# Patient Record
Sex: MALE | Race: WHITE | NOT HISPANIC OR LATINO | Employment: UNEMPLOYED | URBAN - METROPOLITAN AREA
[De-identification: names, ages, dates, MRNs, and addresses within clinical notes are randomized per-mention and may not be internally consistent; named-entity substitution may affect disease eponyms.]

---

## 2022-10-11 ENCOUNTER — OFFICE VISIT (OUTPATIENT)
Dept: FAMILY MEDICINE | Facility: CLINIC | Age: 27
End: 2022-10-11
Payer: COMMERCIAL

## 2022-10-11 VITALS
HEIGHT: 70 IN | BODY MASS INDEX: 45.1 KG/M2 | RESPIRATION RATE: 15 BRPM | WEIGHT: 315 LBS | DIASTOLIC BLOOD PRESSURE: 74 MMHG | OXYGEN SATURATION: 98 % | TEMPERATURE: 97.9 F | HEART RATE: 96 BPM | SYSTOLIC BLOOD PRESSURE: 110 MMHG

## 2022-10-11 DIAGNOSIS — Z13.220 SCREENING FOR HYPERLIPIDEMIA: ICD-10-CM

## 2022-10-11 DIAGNOSIS — Z13.220 SCREENING FOR LIPID DISORDERS: ICD-10-CM

## 2022-10-11 DIAGNOSIS — L72.0 EPIDERMAL CYST: ICD-10-CM

## 2022-10-11 DIAGNOSIS — Z00.00 ROUTINE GENERAL MEDICAL EXAMINATION AT A HEALTH CARE FACILITY: Primary | ICD-10-CM

## 2022-10-11 PROCEDURE — 99385 PREV VISIT NEW AGE 18-39: CPT | Performed by: NURSE PRACTITIONER

## 2022-10-11 PROCEDURE — 99214 OFFICE O/P EST MOD 30 MIN: CPT | Mod: 25 | Performed by: NURSE PRACTITIONER

## 2022-10-11 ASSESSMENT — ENCOUNTER SYMPTOMS
COUGH: 0
DIZZINESS: 0
WEAKNESS: 0
SHORTNESS OF BREATH: 0
NAUSEA: 0
PALPITATIONS: 0
PARESTHESIAS: 0
MYALGIAS: 0
FREQUENCY: 0
HEMATOCHEZIA: 0
DYSURIA: 0
NERVOUS/ANXIOUS: 0
FEVER: 0
HEADACHES: 1
DIARRHEA: 0
CONSTIPATION: 0
SORE THROAT: 0
EYE PAIN: 0
ABDOMINAL PAIN: 0
ARTHRALGIAS: 0
HEMATURIA: 0
JOINT SWELLING: 0
HEARTBURN: 0
CHILLS: 0

## 2022-10-11 NOTE — PROGRESS NOTES
SUBJECTIVE:   CC: Andres is an 27 year old who presents for preventative health visit.     27 year old year old male  with PMH   Patient Active Problem List   Diagnosis Code     Obesity E66.9     Tendonitis, Achilles, left M76.62     Morbid obesity due to excess calories (H) E66.01     BMI 50.0-59.9, adult (H) Z68.43     CARDIOVASCULAR SCREENING; LDL GOAL LESS THAN 160 Z13.6    in clinic for preventive health care exam.     In addition to the preventive visit, 25  minutes of the appointment were spent evaluating and developing a treatment plan for his additional concern(s).      Acute Add ON:  - lump on left side of head present for long time unknown lengthen of time; unknown change in size; started getting tender 1 month ago; no vision change; + HA to area throbbing lasting 1 day or so w/dizziness;     Patient has been advised of split billing requirements and indicates understanding: Yes  Healthy Habits:     Getting at least 3 servings of Calcium per day:  Yes    Bi-annual eye exam:  NO    Dental care twice a year:  NO    Sleep apnea or symptoms of sleep apnea:  Daytime drowsiness    Diet:  Regular (no restrictions)    Frequency of exercise:  2-3 days/week    Duration of exercise:  Less than 15 minutes    Taking medications regularly:  Yes    Medication side effects:  None    PHQ-2 Total Score: 0    Additional concerns today:  Yes        Today's PHQ-2 Score:   PHQ-2 ( 1999 [employer]) 10/11/2022   Q1: Little interest or pleasure in doing things 0   Q2: Feeling down, depressed or hopeless 0   PHQ-2 Score 0   Q1: Little interest or pleasure in doing things Not at all   Q2: Feeling down, depressed or hopeless Not at all   PHQ-2 Score 0       Abuse: Current or Past(Physical, Sexual or Emotional)- No  Do you feel safe in your environment? Yes        Social History     Tobacco Use     Smoking status: Never     Smokeless tobacco: Never     Tobacco comments:     Father smokes out side   Substance Use Topics     Alcohol use: No      If you drink alcohol do you typically have >3 drinks per day or >7 drinks per week? No    Alcohol Use 10/11/2022   Prescreen: >3 drinks/day or >7 drinks/week? No   No flowsheet data found.    Last PSA: No results found for: PSA    Reviewed orders with patient. Reviewed health maintenance and updated orders accordingly - Yes  Lab work is in process  Labs reviewed in EPIC  BP Readings from Last 3 Encounters:   10/11/22 110/74   10/06/16 122/74   02/01/11 120/60    Wt Readings from Last 3 Encounters:   10/11/22 (!) 214.3 kg (472 lb 6.4 oz)   10/06/16 (!) 158.3 kg (349 lb)   02/01/11 119.7 kg (264 lb) (>99 %, Z= 2.66)*     * Growth percentiles are based on [facility] (Boys, 2-20 Years) data.              Reviewed and updated as needed this visit by clinical staff   Tobacco  Allergies  Meds  Problems  Med Hx  Surg Hx  Fam Hx  Soc   Hx        Reviewed and updated as needed this visit by Provider   Tobacco  Allergies  Meds  Problems  Med Hx  Surg Hx  Fam Hx         Past Medical History:   Diagnosis Date     BMI 50.0-59.9, adult (H) 10/6/2016     CARDIOVASCULAR SCREENING; LDL GOAL LESS THAN 160 10/6/2016     Morbid obesity due to excess calories (H) 10/6/2016     NO ACTIVE PROBLEMS      Obesity 11/17/2008     Tendonitis, Achilles, left 10/6/2016        Review of Systems   Constitutional: Negative for chills and fever.   HENT: Negative for congestion, ear pain, hearing loss and sore throat.    Eyes: Negative for pain and visual disturbance.   Respiratory: Negative for cough and shortness of breath.    Cardiovascular: Negative for chest pain, palpitations and peripheral edema.   Gastrointestinal: Negative for abdominal pain, constipation, diarrhea, heartburn, hematochezia and nausea.   Genitourinary: Negative for dysuria, frequency, genital sores, hematuria, impotence, penile discharge and urgency.   Musculoskeletal: Negative for arthralgias, joint swelling and myalgias.   Skin: Negative for rash.   Neurological:  "Positive for headaches. Negative for dizziness, weakness and paresthesias.   Psychiatric/Behavioral: Negative for mood changes. The patient is not nervous/anxious.        OBJECTIVE:   /74 (BP Location: Left arm, Patient Position: Sitting, Cuff Size: Thigh)   Pulse 96   Temp 97.9  F (36.6  C) (Temporal)   Resp 15   Ht 1.778 m (5' 10\")   Wt (!) 214.3 kg (472 lb 6.4 oz)   SpO2 98%   BMI 67.78 kg/m      Physical Exam  Constitutional:       General: He is not in acute distress.     Appearance: He is well-developed. He is obese. He is not ill-appearing.   HENT:      Head: Normocephalic and atraumatic.      Comments: 3 cm tender, mobile cyst to left frontal region     Right Ear: Tympanic membrane, ear canal and external ear normal.      Left Ear: Tympanic membrane, ear canal and external ear normal.      Nose: Nose normal.      Mouth/Throat:      Mouth: Mucous membranes are moist. No oral lesions.   Eyes:      Extraocular Movements: Extraocular movements intact.      Conjunctiva/sclera: Conjunctivae normal.   Neck:      Thyroid: No thyromegaly.      Trachea: No tracheal deviation.   Cardiovascular:      Rate and Rhythm: Normal rate and regular rhythm.      Heart sounds: Normal heart sounds, S1 normal and S2 normal. No murmur heard.    No S3 or S4 sounds.   Pulmonary:      Effort: Pulmonary effort is normal. No respiratory distress.      Breath sounds: Normal breath sounds. No wheezing or rales.   Abdominal:      General: Bowel sounds are normal.      Palpations: Abdomen is soft. There is no mass.      Tenderness: There is no abdominal tenderness.   Musculoskeletal:         General: No deformity. Normal range of motion.      Cervical back: Neck supple.   Lymphadenopathy:      Cervical: No cervical adenopathy.   Skin:     General: Skin is warm and dry.      Comments: Disheveled; poor hygiene, morbid obese     Neurological:      Mental Status: He is alert and oriented to person, place, and time.      Motor: No " "abnormal muscle tone.      Deep Tendon Reflexes: Reflexes are normal and symmetric.   Psychiatric:         Speech: Speech normal.         Behavior: Behavior normal.         Thought Content: Thought content normal.         Judgment: Judgment normal.           Diagnostic Test Results:  Labs reviewed in Epic  No results found for any visits on 10/11/22.    ASSESSMENT/PLAN:   Andres was seen today for physical.    Diagnoses and all orders for this visit:    Routine general medical examination at a health care facility  Preventative exam w/no abnormalities and/or concerns listed in diagnoses; discussed health maintenance screenings including prostate, breast, cervical and colorectal ca screenings related to gender;  reviewed and reconciled medication, medical history and patient related health concerns  Plan: declined metabolic labs  -     INFLUENZA VACCINE IM > 6 MONTHS VALENT IIV4 (AFLURIA/FLUZONE)  -     INFLUENZA VACCINE IM > 6 MONTHS VALENT IIV4 (AFLURIA/FLUZONE)    Screening for hyperlipidemia  Discussed; low risk; declined     Screening for lipid disorders  Discussed; declined     Epidermal cyst  3 mm cyst; ? Lipoma vs other  Refer to Gen/Surg for excision   -     Adult General Surg Referral; Future    BMI 50.0-59.9, adult (H)  Morbid obesity; discussed risk of DM, HLD, CROWELL,       Patient has been advised of split billing requirements and indicates understanding: Yes    COUNSELING:   Reviewed preventive health counseling, as reflected in patient instructions       Regular exercise       Healthy diet/nutrition       Consider Hep C screening for all patients one time for ages 18-79 years       HIV screeninx in teen years, 1x in adult years, and at intervals if high risk    Estimated body mass index is 67.78 kg/m  as calculated from the following:    Height as of this encounter: 1.778 m (5' 10\").    Weight as of this encounter: 214.3 kg (472 lb 6.4 oz).     Weight management plan: Discussed healthy diet and " exercise guidelines    He reports that he has never smoked. He has never used smokeless tobacco.      Counseling Resources:  ATP IV Guidelines  Pooled Cohorts Equation Calculator  FRAX Risk Assessment  ICSI Preventive Guidelines  Dietary Guidelines for Americans, 2010  USDA's MyPlate  ASA Prophylaxis  Lung CA Screening    SWATHI Montilla CNP  [facility]

## 2022-10-11 NOTE — PATIENT INSTRUCTIONS
seb  Preventive Health Recommendations  Male Ages 26 - 39    Yearly exam:             See your health care provider every year in order to  o   Review health changes.   o   Discuss preventive care.    o   Review your medicines if your doctor has prescribed any.    You should be tested each year for STDs (sexually transmitted diseases), if you re at risk.     After age 35, talk to your provider about cholesterol testing. If you are at risk for heart disease, have your cholesterol tested at least every 5 years.     If you are at risk for diabetes, you should have a diabetes test (fasting glucose).  Shots: Get a flu shot each year. Get a tetanus shot every 10 years.     Nutrition:    Eat at least 5 servings of fruits and vegetables daily.     Eat whole-grain bread, whole-wheat pasta and brown rice instead of white grains and rice.     Get adequate Calcium and Vitamin D.     Lifestyle    Exercise for at least 150 minutes a week (30 minutes a day, 5 days a week). This will help you control your weight and prevent disease.     Limit alcohol to one drink per day.     No smoking.     Wear sunscreen to prevent skin cancer.     See your dentist every six months for an exam and cleaning.     Preventive Health Recommendations  Male Ages 26 - 39    Yearly exam:             See your health care provider every year in order to  o   Review health changes.   o   Discuss preventive care.    o   Review your medicines if your doctor has prescribed any.    You should be tested each year for STDs (sexually transmitted diseases), if you re at risk.     After age 35, talk to your provider about cholesterol testing. If you are at risk for heart disease, have your cholesterol tested at least every 5 years.     If you are at risk for diabetes, you should have a diabetes test (fasting glucose).  Shots: Get a flu shot each year. Get a tetanus shot every 10 years.     Nutrition:    Eat at least 5 servings of fruits and vegetables daily.     Eat  whole-grain bread, whole-wheat pasta and brown rice instead of white grains and rice.     Get adequate Calcium and Vitamin D.     Lifestyle    Exercise for at least 150 minutes a week (30 minutes a day, 5 days a week). This will help you control your weight and prevent disease.     Limit alcohol to one drink per day.     No smoking.     Wear sunscreen to prevent skin cancer.     See your dentist every six months for an exam and cleaning.     Preventive Health Recommendations  Male Ages 26 - 39    Yearly exam:             See your health care provider every year in order to  o   Review health changes.   o   Discuss preventive care.    o   Review your medicines if your doctor has prescribed any.    You should be tested each year for STDs (sexually transmitted diseases), if you re at risk.     After age 35, talk to your provider about cholesterol testing. If you are at risk for heart disease, have your cholesterol tested at least every 5 years.     If you are at risk for diabetes, you should have a diabetes test (fasting glucose).  Shots: Get a flu shot each year. Get a tetanus shot every 10 years.     Nutrition:    Eat at least 5 servings of fruits and vegetables daily.     Eat whole-grain bread, whole-wheat pasta and brown rice instead of white grains and rice.     Get adequate Calcium and Vitamin D.     Lifestyle    Exercise for at least 150 minutes a week (30 minutes a day, 5 days a week). This will help you control your weight and prevent disease.     Limit alcohol to one drink per day.     No smoking.     Wear sunscreen to prevent skin cancer.     See your dentist every six months for an exam and cleaning.

## 2022-10-11 NOTE — PROGRESS NOTES
"  {PROVIDER CHARTING PREFERENCE:050178}    Hipolito Campos is a 27 year old{ACCOMPANIED BY STATEMENT (Optional):422759}, presenting for the following health issues: bump on head     - lump on left side of head present for long time unknown lengthen of time; unknown change in size; started getting tender 1 month ago; no vision change; + HA to area throbbing lasting 1 day or so w/dizziness;     HPI     {SUPERLIST (Optional):583060}  {additonal problems for provider to add (Optional):469399}    Review of Systems   {ROS COMP (Optional):767541}      Objective    /74 (BP Location: Left arm, Patient Position: Sitting, Cuff Size: Thigh)   Pulse 96   Temp 97.9  F (36.6  C) (Temporal)   Resp 15   Ht 1.778 m (5' 10\")   Wt (!) 214.3 kg (472 lb 6.4 oz)   SpO2 98%   BMI 67.78 kg/m    Body mass index is 67.78 kg/m .  Physical Exam   {Exam List (Optional):233280}    {Diagnostic Test Results (Optional):691197}    {AMBULATORY ATTESTATION (Optional):268489}            "

## 2022-11-09 ENCOUNTER — HOSPITAL ENCOUNTER (OUTPATIENT)
Facility: HOSPITAL | Age: 27
End: 2022-11-09
Attending: SURGERY | Admitting: SURGERY
Payer: COMMERCIAL

## 2022-11-09 ENCOUNTER — OFFICE VISIT (OUTPATIENT)
Dept: SURGERY | Facility: CLINIC | Age: 27
End: 2022-11-09
Attending: NURSE PRACTITIONER
Payer: COMMERCIAL

## 2022-11-09 VITALS
HEIGHT: 70 IN | WEIGHT: 315 LBS | HEART RATE: 94 BPM | DIASTOLIC BLOOD PRESSURE: 77 MMHG | BODY MASS INDEX: 45.1 KG/M2 | SYSTOLIC BLOOD PRESSURE: 141 MMHG

## 2022-11-09 DIAGNOSIS — L72.3 SEBACEOUS CYST: Primary | ICD-10-CM

## 2022-11-09 DIAGNOSIS — L72.0 EPIDERMAL CYST: ICD-10-CM

## 2022-11-09 PROCEDURE — 99203 OFFICE O/P NEW LOW 30 MIN: CPT | Performed by: SURGERY

## 2022-11-09 RX ORDER — CEFAZOLIN SODIUM/WATER 3 G/30 ML
3 SYRINGE (ML) INTRAVENOUS
Status: CANCELLED | OUTPATIENT
Start: 2022-11-21

## 2022-11-09 RX ORDER — CEFAZOLIN SODIUM/WATER 3 G/30 ML
3 SYRINGE (ML) INTRAVENOUS SEE ADMIN INSTRUCTIONS
Status: CANCELLED | OUTPATIENT
Start: 2022-11-21

## 2022-11-09 RX ORDER — ACETAMINOPHEN 325 MG/1
975 TABLET ORAL ONCE
Status: CANCELLED | OUTPATIENT
Start: 2022-11-21 | End: 2022-11-09

## 2022-11-09 NOTE — LETTER
11/9/2022         RE: Andres Dominguez  [address] [facility] [address]        Dear Colleague,    Thank you for referring your patient, Andres Dominguez, to the [facility] [place]. Please see a copy of my visit note below.    This is a consultation from No Ref-Primary, Physician to address a subcutaneous mass.    HPI: Pt is here with concerns about a subcutaneous mass located on his head.  It has been present for 2 months.   This lesion is tender.  The lesion has not drained.    Allergies:Nkda [no known drug allergies]    Past Medical History:   Diagnosis Date     BMI 50.0-59.9, adult (H) 10/6/2016     CARDIOVASCULAR SCREENING; LDL GOAL LESS THAN 160 10/6/2016     Morbid obesity due to excess calories (H) 10/6/2016     NO ACTIVE PROBLEMS      Obesity 11/17/2008     Tendonitis, Achilles, left 10/6/2016       Past Surgical History:   Procedure Laterality Date     NO HISTORY OF SURGERY         REVIEW OF SYSTEMS:  10 point ROS is negative except for; as mentioned above.    CURRENT MEDS:    Current Outpatient Medications:      NO ACTIVE MEDICATIONS, , Disp: , Rfl:     There were no vitals taken for this visit.  There is no height or weight on file to calculate BMI.    EXAM:  GENERAL:Well developed he appears his stated age  HEAD & NECK: Extraocular motions intact, anicteric sclera,  ABDOMEN: Soft and nondistended, positive bowel sounds  LUNGS:  CTA  HEART:  RRR  EXTREMITIES: Full mobility,   INTEGUMENT: The patient has a subcutaneous lesion located his scalp.   The lesion is 2 cm wide.    Assessment/Plan: The pt has a  2 cm  subcutaneous lesion located on the scalp.    This lesion is likely either a Sebaccous Cyst. These lesion is growing in size and/or is painful at times.  With these features I recommend removal of this lesion.     He would like to have these lesions removed. I discussed this with he. I discussed the risk of bleeding and infection with the patient.  We will get this scheduled through our clinic.    We also discussed his weight and he agreed to be seen within our weigh management program.  I will set him up with a consult with a bariatrician.    Saulo Manley MD ,MD  [phone]  [facility] Department of Surgery      Again, thank you for allowing me to participate in the care of your patient.        Sincerely,        Saulo Manley MD

## 2022-11-16 ENCOUNTER — OFFICE VISIT (OUTPATIENT)
Dept: FAMILY MEDICINE | Facility: CLINIC | Age: 27
End: 2022-11-16
Payer: COMMERCIAL

## 2022-11-16 VITALS
TEMPERATURE: 97 F | RESPIRATION RATE: 22 BRPM | HEIGHT: 71 IN | SYSTOLIC BLOOD PRESSURE: 134 MMHG | WEIGHT: 315 LBS | BODY MASS INDEX: 44.1 KG/M2 | HEART RATE: 96 BPM | DIASTOLIC BLOOD PRESSURE: 86 MMHG | OXYGEN SATURATION: 96 %

## 2022-11-16 DIAGNOSIS — L72.3 SEBACEOUS CYST: ICD-10-CM

## 2022-11-16 DIAGNOSIS — E66.01 MORBID OBESITY WITH BODY MASS INDEX OF 60.0-69.9 IN ADULT (H): ICD-10-CM

## 2022-11-16 DIAGNOSIS — Z01.818 PREOP GENERAL PHYSICAL EXAM: Primary | ICD-10-CM

## 2022-11-16 LAB — HGB BLD-MCNC: 15.4 G/DL (ref 13.3–17.7)

## 2022-11-16 PROCEDURE — 85018 HEMOGLOBIN: CPT | Performed by: NURSE PRACTITIONER

## 2022-11-16 PROCEDURE — 36415 COLL VENOUS BLD VENIPUNCTURE: CPT | Performed by: NURSE PRACTITIONER

## 2022-11-16 PROCEDURE — 99214 OFFICE O/P EST MOD 30 MIN: CPT | Performed by: NURSE PRACTITIONER

## 2022-11-16 NOTE — PATIENT INSTRUCTIONS
Preparing for Your Surgery  Getting started  A nurse will call you to review your health history and instructions. They will give you an arrival time based on your scheduled surgery time. Please be ready to share:    Your doctor s clinic name and phone number    Your medical, surgical, and anesthesia history    A list of allergies and sensitivities    A list of medicines, including herbal treatments and over-the-counter drugs    Whether the patient has a legal guardian (ask how to send us the papers in advance)  Please tell us if you re pregnant--or if there s any chance you might be pregnant. Some surgeries may injure a fetus (unborn baby), so they require a pregnancy test. Surgeries that are safe for a fetus don t always need a test, and you can choose whether to have one.   If you have a child who s having surgery, please ask for a copy of Preparing for Your Child s Surgery.    Preparing for surgery    Within 10 to 30 days of surgery: Have a pre-op exam (sometimes called an H&P, or History and Physical). This can be done at a clinic or pre-operative center.  ? If you re having a , you may not need this exam. Talk to your care team.    At your pre-op exam, talk to your care team about all medicines you take. If you need to stop any medicines before surgery, ask when to start taking them again.  ? We do this for your safety. Many medicines can make you bleed too much during surgery. Some change how well surgery (anesthesia) drugs work.    Call your insurance company to let them know you re having surgery. (If you don t have insurance, call [phone].)    Call your clinic if there s any change in your health. This includes signs of a cold or flu (sore throat, runny nose, cough, rash, fever). It also includes a scrape or scratch near the surgery site.    If you have questions on the day of surgery, call your hospital or surgery center.  COVID testing  You may need to be tested for COVID-19 before having  surgery. If so, we will give you instructions (or click here).  Eating and drinking guidelines  For your safety: Unless your surgeon tells you otherwise, follow the guidelines below.    Eat and drink as usual until 8 hours before you arrive for surgery. After that, no food or milk.    Drink clear liquids until 2 hours before you arrive. These are liquids you can see through, like water, Gatorade, and Propel Water. They also include plain black coffee and tea (no cream or milk), candy, and breath mints. You can spit out gum when you arrive.    If you drink alcohol: Stop drinking it the night before surgery.    If your care team tells you to take medicine on the morning of surgery, it s okay to take it with a sip of water.  Preventing infection    Shower or bathe the night before and morning of your surgery. Follow the instructions your clinic gave you. (If no instructions, use regular soap.)    Don t shave or clip hair near your surgery site. We ll remove the hair if needed.    Don t smoke or vape the morning of surgery. You may chew nicotine gum up to 2 hours before surgery. A nicotine patch is okay.  ? Note: Some surgeries require you to completely quit smoking and nicotine. Check with your surgeon.    Your care team will make every effort to keep you safe from infection. We will:  ? Clean our hands often with soap and water (or an alcohol-based hand rub).  ? Clean the skin at your surgery site with a special soap that kills germs.  ? Give you a special gown to keep you warm. (Cold raises the risk of infection.)  ? Wear special hair covers, masks, gowns and gloves during surgery.  ? Give antibiotic medicine, if prescribed. Not all surgeries need antibiotics.  What to bring on the day of surgery    Photo ID and insurance card    Copy of your health care directive, if you have one    Glasses and hearing aids (bring cases)  ? You can t wear contacts during surgery    Inhaler and eye drops, if you use them (tell us  about these when you arrive)    CPAP machine or breathing device, if you use them    A few personal items, if spending the night    If you have . . .  ? A pacemaker, ICD (cardiac defibrillator) or other implant: Bring the ID card.  ? An implanted stimulator: Bring the remote control.  ? A legal guardian: Bring a copy of the certified (court-stamped) guardianship papers.  Please remove any jewelry, including body piercings. Leave jewelry and other valuables at home.  If you re going home the day of surgery    You must have a responsible adult drive you home. They should stay with you overnight as well.    If you don t have someone to stay with you, and you aren t safe to go home alone, we may keep you overnight. Insurance often won t pay for this.  After surgery  If it s hard to control your pain or you need more pain medicine, please call your surgeon s office.  Questions?   If you have any questions for your care team, list them here:   ____________________________________________________________________________________________________________________________________________________________________________________________________________________________________________________________________  For informational purposes only. Not to replace the advice of your health care provider. Copyright   2003, 2019 [employer]. All rights reserved. Clinically reviewed by Abbi Winn MD. [employer] [id number] - REV 10/22.

## 2022-11-16 NOTE — PROGRESS NOTES
[facility] [address] [place] [address]  Phone: [phone]  Primary Provider: No Ref-Primary, Physician  Pre-op Performing Provider: PADMAJA AJ      PREOPERATIVE EVALUATION:  Today's date: 11/16/2022    Andres Dominguez is a 27 year old male who presents for a preoperative evaluation.    Surgical Information:  Surgery/Procedure: EXCISION, LESION, HEAD  Surgery Location: [facility]  Surgeon: Dr. Manley  Surgery Date: 11/21/2022  Time of Surgery: 11:40 AM  Where patient plans to recover: At home with family  Fax number for surgical facility: Note does not need to be faxed, will be available electronically in Epic.    Type of Anesthesia Anticipated: TBD    Assessment & Plan     The proposed surgical procedure is considered INTERMEDIATE risk.    (Z01.818) Preop general physical exam  (primary encounter diagnosis)  Comment:   Plan: Hemoglobin            (L72.3) Sebaceous cyst  Comment:   Plan:     (E66.01,  Z68.44) Morbid obesity with body mass index of 60.0-69.9 in adult (H)  Comment:   Plan:            Risks and Recommendations:  The patient has the following additional risks and recommendations for perioperative complications:   - Morbid obesity (BMI >40)        RECOMMENDATION:  APPROVAL GIVEN to proceed with proposed procedure, without further diagnostic evaluation.              Subjective     HPI related to upcoming procedure: Sebaceous cyst on scalp present for over 2 months, tender.     Preop Questions 11/16/2022   1. Have you ever had a heart attack or stroke? No   2. Have you ever had surgery on your heart or blood vessels, such as a stent placement, a coronary artery bypass, or surgery on an artery in your head, neck, heart, or legs? No   3. Do you have chest pain with activity? No   4. Do you have a history of  heart failure? No   5. Do you currently have a cold, bronchitis or symptoms of other infection? No   6. Do you have a cough, shortness of breath, or  wheezing? No   7. Do you or anyone in your family have previous history of blood clots? No   8. Do you or does anyone in your family have a serious bleeding problem such as prolonged bleeding following surgeries or cuts? No   9. Have you ever had problems with anemia or been told to take iron pills? No   10. Have you had any abnormal blood loss such as black, tarry or bloody stools? No   11. Have you ever had a blood transfusion? No   12. Are you willing to have a blood transfusion if it is medically needed before, during, or after your surgery? NO    13. Have you or any of your relatives ever had problems with anesthesia? No   14. Do you have sleep apnea, excessive snoring or daytime drowsiness? No   15. Do you have any artifical heart valves or other implanted medical devices like a pacemaker, defibrillator, or continuous glucose monitor? No   16. Do you have artificial joints? No   17. Are you allergic to latex? No       Health Care Directive:  Patient does not have a Health Care Directive or Living Will:     Preoperative Review of :   reviewed - no record of controlled substances prescribed.          Review of Systems  CONSTITUTIONAL: NEGATIVE for fever, chills, change in weight  INTEGUMENTARY/SKIN: NEGATIVE for worrisome rashes, moles or lesions  EYES: NEGATIVE for vision changes or irritation  ENT/MOUTH: NEGATIVE for ear, mouth and throat problems  RESP: NEGATIVE for significant cough or SOB  CV: NEGATIVE for chest pain, palpitations or peripheral edema  GI: NEGATIVE for nausea, abdominal pain, heartburn, or change in bowel habits  : NEGATIVE for frequency, dysuria, or hematuria  MUSCULOSKELETAL: NEGATIVE for significant arthralgias or myalgia  NEURO: NEGATIVE for weakness, dizziness or paresthesias  ENDOCRINE: NEGATIVE for temperature intolerance, skin/hair changes  HEME: NEGATIVE for bleeding problems  PSYCHIATRIC: NEGATIVE for changes in mood or affect    Patient Active Problem List    Diagnosis  "Date Noted     Tendonitis, Achilles, left 10/06/2016     Priority: Medium     Morbid obesity due to excess calories (H) 10/06/2016     Priority: Medium     BMI 50.0-59.9, adult (H) 10/06/2016     Priority: Medium     CARDIOVASCULAR SCREENING; LDL GOAL LESS THAN 160 10/06/2016     Priority: Medium     Obesity 11/17/2008     Priority: Medium      Past Medical History:   Diagnosis Date     BMI 50.0-59.9, adult (H) 10/6/2016     CARDIOVASCULAR SCREENING; LDL GOAL LESS THAN 160 10/6/2016     Morbid obesity due to excess calories (H) 10/6/2016     NO ACTIVE PROBLEMS      Obesity 11/17/2008     Tendonitis, Achilles, left 10/6/2016     Past Surgical History:   Procedure Laterality Date     NO HISTORY OF SURGERY       Current Outpatient Medications   Medication Sig Dispense Refill     NO ACTIVE MEDICATIONS          Allergies   Allergen Reactions     Nkda [No Known Drug Allergies]         Social History     Tobacco Use     Smoking status: Never     Smokeless tobacco: Never     Tobacco comments:     Father smokes out side   Substance Use Topics     Alcohol use: No       History   Drug Use No         Objective     /86 (BP Location: Left arm, Patient Position: Sitting, Cuff Size: Adult Large)   Pulse 96   Temp 97  F (36.1  C) (Temporal)   Resp 22   Ht 1.808 m (5' 11.18\")   Wt (!) 214.6 kg (473 lb)   SpO2 96%   BMI 65.64 kg/m      Physical Exam    GENERAL APPEARANCE: healthy, alert and no distress     EYES: EOMI,  PERRL     HENT: ear canals and TM's normal and nose and mouth without ulcers or lesions     NECK: no adenopathy, no asymmetry, masses, or scars and thyroid normal to palpation     RESP: lungs clear to auscultation - no rales, rhonchi or wheezes     CV: regular rates and rhythm, normal S1 S2, no S3 or S4 and no murmur, click or rub     ABDOMEN:  soft, nontender, no HSM or masses and bowel sounds normal     MS: extremities normal- no gross deformities noted, no evidence of inflammation in joints, FROM in all " extremities.     SKIN: cystic nodule on scalp     NEURO: Normal strength and tone, sensory exam grossly normal, mentation intact and speech normal     PSYCH: mentation appears normal. and affect normal/bright     LYMPHATICS: No cervical adenopathy    No results for input(s): HGB, PLT, INR, NA, POTASSIUM, CR, A1C in the last 83138 hours.     Diagnostics:  Labs pending at this time.  Results will be reviewed when available.   No EKG required, no history of coronary heart disease, significant arrhythmia, peripheral arterial disease or other structural heart disease.    Revised Cardiac Risk Index (RCRI):  The patient has the following serious cardiovascular risks for perioperative complications:   - No serious cardiac risks = 0 points     RCRI Interpretation: 0 points: Class I (very low risk - 0.4% complication rate)           Signed Electronically by: Marci Allen NP  Copy of this evaluation report is provided to requesting physician.

## 2022-11-20 RX ORDER — LIDOCAINE 40 MG/G
CREAM TOPICAL
Status: CANCELLED | OUTPATIENT
Start: 2022-11-20

## 2022-11-20 RX ORDER — ACETAMINOPHEN 325 MG/1
975 TABLET ORAL ONCE
Status: CANCELLED | OUTPATIENT
Start: 2022-11-20 | End: 2022-11-20

## 2022-11-20 RX ORDER — SODIUM CHLORIDE, SODIUM LACTATE, POTASSIUM CHLORIDE, CALCIUM CHLORIDE 600; 310; 30; 20 MG/100ML; MG/100ML; MG/100ML; MG/100ML
INJECTION, SOLUTION INTRAVENOUS CONTINUOUS
Status: CANCELLED | OUTPATIENT
Start: 2022-11-20

## 2022-12-02 ENCOUNTER — TELEPHONE (OUTPATIENT)
Dept: SURGERY | Facility: CLINIC | Age: 27
End: 2022-12-02

## 2022-12-02 NOTE — TELEPHONE ENCOUNTER
Returned call to Marci, caregiver for Andres, to reschedule surgery. Had to leave her a message. Will await return call.

## 2022-12-28 NOTE — PROGRESS NOTES
New Medical Weight Management Consult    PATIENT:  Andres Dominguez  MRN:         [id number]  :         1995  SHYAM:         2022        I had the pleasure of seeing your patient, Andres Dominguez. Full intake/assessment was done to determine barriers to weight loss success and develop a treatment plan. Andres Dominguez is a 27 year old male interested in treatment of medical problems associated with excess weight. He has a height of Data Unavailable, a weight of 0 lbs 0 oz, and the calculated There is no height or weight on file to calculate BMI.    Andres is a patient with early onset class III, super morbid obesity with significant element of familial/genetic influence and without current health consequences that are known, but high risk for sleep apnea and fatty liver disease given his central adiposity/thick neck and fatigued affect. He does need aggressive weight loss plan due to high risk for multi-organ dysfunction/metabolic disease/degenerative skeletal issues over the next 5-10 years.  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      His problem is complicated by strong craving/reward pathways.      Review of the patient's history and habits today suggest that weight gain has been lifelong issues.    Previous Interventions found to be helpful in the past for weight loss include none..    We discussed a toolbox approach to weight management today and he is open to combining dietary therapy with inreased mindfulness techniques, activity/exercise to produce weight reduction. Medication assistance for appetite control was discussed today and on review of the risks/benefits for this patients health history, we've decided to with start appetite suppressant therapy.  Given his super morbid obesity, Wegovy would  offer the highest average weight reduction of currently available appetite suppressant therapies and we'll see if good coverage is provided by his insurance.      ADDENDUM:  Insurance will not cover Wegovy without 12 week trial of combined low calorie diet/phentermine therapy first. Will call patient to discuss dosing options and plan.  Ji Cox MD  1/4/2023  1:45 PM    ASSESSMENT AND PLAN  Problem List Items Addressed This Visit        Digestive    Obesity - Primary    Relevant Medications    Semaglutide-Weight Management (WEGOVY) 0.25 MG/0.5ML SOAJ    Other Relevant Orders    CBC with platelets    Comprehensive metabolic panel    Hemoglobin A1c    TSH    T3 Free    Prolactin    Parathyroid Hormone Intact    25- OH-Vitamin D    Vitamin B12    Adult Sleep Eval & Management  Referral            55 minutes spent on the date of the encounter doing chart review, history and exam, documentation and further activities per the note      Patient has not yet filled out questionnaires the day prior to visit.  Chart review shows:   Visit with Dr. Manley in surgical clinic for cyst removal from his scalp and referred for weight management given super morbid obesity history.  Given his age and no previous history of medication supported weight loss in the recent past, today, we discussed dietary/behavior mindfulness methods, exercise and medication support to help get his weight moving in the proper direction. With a BMI of 65, long term there is a high probability of requiring bariatric surgery tools to complement his dietary/medication management to reduce risk of accelerated degenerative skeletal disease and metabolic/vascular, liver/kidney and neoplastic diseases innate to his condition of super morbid obesity.    No previous imaging on file.  He has the following co-morbidities:    No flowsheet data found.  Bowl of cereal/Turkmen toast/pancakes for breakfast, monster drink.  First meal of the day 9am.   Will  snack through the day with candy. Chips.   Supper is around 6 pm.   Bedtime is 9pm up at 1am. Back to bed around 4am and sleeps until 9am.  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 No snacks/eating at this time.  Lives w/ sisters.      No flowsheet data found.    No flowsheet data found.    No flowsheet data found.    No flowsheet data found.    No flowsheet data found.    No flowsheet data found.    No flowsheet data found.    PAST MEDICAL HISTORY:  Past Medical History:   Diagnosis Date     BMI 50.0-59.9, adult (H) 10/6/2016     CARDIOVASCULAR SCREENING; LDL GOAL LESS THAN 160 10/6/2016     Morbid obesity due to excess calories (H) 10/6/2016     NO ACTIVE PROBLEMS      Obesity 11/17/2008     Tendonitis, Achilles, left 10/6/2016   Plan:  1. Welcome to your weight loss season. To start we'll get rid of the Monster drink and candy snacks/chips during the day. To curb these cravings, we'll start high protein/vegetable intake goal at 3 meals daily. Meals should come every 4.5-6 hours through the day. Given your disrupted sleep pattern, aim for first meal of the day no later than 9-10am and then lunch around 1-3pm then dinner around 6 pm and getting to bed by 9pm.      2. Each meal should have 30 grams of lean protein. If that's too hard, consider using a protein drink in the afternoon, Premiere Protein or Equate Max Protein do a good job and are in most grocery/big box stores.  Goal intake is 100-120grams of protein daily right now.     3. Increase water intake to 100-120oz/day. The combination of increased water and protein should decrease your sweet tooth cravings.    4. 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     5. If covered, we'll ramp up Wegovy:  0.25mg once weekly (I recommend Thursdays if you have the weekends off) for 4 weeks. Let me know if tolerated well and we'll  ramp up then to 0.5mg/week for 4 weeks, then 1mg/week for 4 weeks then 1.7mg/week for 4 weeks and finally 2.4mg/week for 4 weeks. If this medication isn't covered we'll review alternatives.    6. Referral for sleep study given high risk for hypoventilation and sleep apnea in how you hold your weight.    7. Check labs at your convenience this month.    8.recheck with me in 8 weeks and schedule with dietician.    No flowsheet data found.    No flowsheet data found.    No flowsheet data found.    Past Surgical History:   Procedure Laterality Date     NO HISTORY OF SURGERY         Social History     Socioeconomic History     Marital status: Single     Spouse name: Not on file     Number of children: Not on file     Years of education: 6     Highest education level: Not on file   Occupational History     Occupation: student   Tobacco Use     Smoking status: Never     Smokeless tobacco: Never     Tobacco comments:     Father smokes out side   Substance and Sexual Activity     Alcohol use: No     Drug use: No     Sexual activity: Never   Other Topics Concern     Parent/sibling w/ CABG, MI or angioplasty before 65F 55M? No   Social History Narrative     Not on file     Social Determinants of Health     Financial Resource Strain: Not on file   Food Insecurity: Not on file   Transportation Needs: Not on file   Physical Activity: Not on file   Stress: Not on file   Social Connections: Not on file   Intimate Partner Violence: Not on file   Housing Stability: Not on file       MEDICATIONS:   Current Outpatient Medications   Medication Sig Dispense Refill     Semaglutide-Weight Management (WEGOVY) 0.25 MG/0.5ML SOAJ Inject 0.25 mg Subcutaneous once a week for 28 days 4 pens. Will ramp up dose monthly if tolerating well to goal of 2.4mg/week eventually. 2 mL 0     NO ACTIVE MEDICATIONS          ALLERGIES:   Allergies   Allergen Reactions     Nkda [No Known Drug Allergies]            PHYSICAL EXAM:  Objective    There were no  "vitals taken for this visit.         Vitals:  No vitals were obtained today due to virtual visit.    Physical Exam   GENERAL: Young male, super morbid obesity, thick neck and tired appearing eyes, smiling/cheerful but limited responses to mostly \"yes/no\" answers. and no distress  EYES: Eyes grossly normal to inspection.  No discharge or erythema, or obvious scleral/conjunctival abnormalities.  RESP:No visible retractions or increased work of breathing.    SKIN: Visible skin clear. No significant rash, abnormal pigmentation or lesions. Video visit clarity impairs.  NEURO: Cranial nerves grossly intact.  Mentation and speech appropriate for age.  PSYCH: pleasant.       Andres Dominguez is 27 year old  male who presents for a billable video visit today.    How would you like to obtain your AVS? Mail a copy  If dropped from the video visit, the video invitation should be resent by: Text to cell phone: [phone]  Will anyone else be joining your video visit? No      Video Start Time: 9:45 AM    Are there any specific questions or needs that you would like addressed at your visit today? No concern or complaints          Video-Visit Details    Type of service:  Video Visit    Platform used for Video Visit: [employer]    Video End Time (time video stopped): 10:38 AM    Originating Location (pt. Location): Home        Distant Location (provider location):  On-site    Distant Location (provider location):  [facility] [place]         Sincerely,    Ji Cox MD        "

## 2022-12-29 ENCOUNTER — VIRTUAL VISIT (OUTPATIENT)
Dept: SURGERY | Facility: CLINIC | Age: 27
End: 2022-12-29
Payer: COMMERCIAL

## 2022-12-29 DIAGNOSIS — E66.01 CLASS 3 SEVERE OBESITY DUE TO EXCESS CALORIES WITH BODY MASS INDEX (BMI) OF 60.0 TO 69.9 IN ADULT, UNSPECIFIED WHETHER SERIOUS COMORBIDITY PRESENT (H): Primary | ICD-10-CM

## 2022-12-29 DIAGNOSIS — E66.813 CLASS 3 SEVERE OBESITY DUE TO EXCESS CALORIES WITH BODY MASS INDEX (BMI) OF 60.0 TO 69.9 IN ADULT, UNSPECIFIED WHETHER SERIOUS COMORBIDITY PRESENT (H): Primary | ICD-10-CM

## 2022-12-29 PROCEDURE — 99215 OFFICE O/P EST HI 40 MIN: CPT | Mod: GT | Performed by: EMERGENCY MEDICINE

## 2022-12-29 RX ORDER — SEMAGLUTIDE 0.25 MG/.5ML
0.25 INJECTION, SOLUTION SUBCUTANEOUS WEEKLY
Qty: 2 ML | Refills: 0 | Status: SHIPPED | OUTPATIENT
Start: 2022-12-29 | End: 2023-01-04 | Stop reason: ALTCHOICE

## 2022-12-29 RX ORDER — LIRAGLUTIDE 6 MG/ML
INJECTION, SOLUTION SUBCUTANEOUS
Qty: 15 ML | Refills: 5 | Status: SHIPPED | OUTPATIENT
Start: 2022-12-29 | End: 2022-12-29

## 2022-12-29 NOTE — LETTER
2022         RE: Andres Dominguez  [address] [facility] [place] [address]        Dear Colleague,    Thank you for referring your patient, Andres Dominguez, to the [facility] [place]. Please see a copy of my visit note below.    New Medical Weight Management Consult    PATIENT:  Andres Dominguez  MRN:         [id number]  :         1995  SHYAM:         2022        I had the pleasure of seeing your patient, Andres Dominguez. Full intake/assessment was done to determine barriers to weight loss success and develop a treatment plan. Andres Dominguez is a 27 year old male interested in treatment of medical problems associated with excess weight. He has a height of Data Unavailable, a weight of 0 lbs 0 oz, and the calculated There is no height or weight on file to calculate BMI.    Andres is a patient with early onset class III, super morbid obesity with significant element of familial/genetic influence and without current health consequences that are known, but high risk for sleep apnea and fatty liver disease given his central adiposity/thick neck and fatigued affect. He does need aggressive weight loss plan due to high risk for multi-organ dysfunction/metabolic disease/degenerative skeletal issues over the next 5-10 years.  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      His problem is complicated by strong craving/reward pathways.      Review of the patient's history and habits today suggest that weight gain has been lifelong issues.    Previous Interventions found to be helpful in the past for weight loss include none..    We discussed a toolbox approach to weight management today and he is open to combining dietary therapy with inreased mindfulness techniques,  activity/exercise to produce weight reduction. Medication assistance for appetite control was discussed today and on review of the risks/benefits for this patients health history, we've decided to with start appetite suppressant therapy.  Given his super morbid obesity, Wegovy would offer the highest average weight reduction of currently available appetite suppressant therapies and we'll see if good coverage is provided by his insurance.    ASSESSMENT AND PLAN  Problem List Items Addressed This Visit        Digestive    Obesity - Primary    Relevant Medications    Semaglutide-Weight Management (WEGOVY) 0.25 MG/0.5ML SOAJ    Other Relevant Orders    CBC with platelets    Comprehensive metabolic panel    Hemoglobin A1c    TSH    T3 Free    Prolactin    Parathyroid Hormone Intact    25- OH-Vitamin D    Vitamin B12    Adult Sleep Eval & Management  Referral            55 minutes spent on the date of the encounter doing chart review, history and exam, documentation and further activities per the note      Patient has not yet filled out questionnaires the day prior to visit.  Chart review shows:   Visit with Dr. Manley in surgical clinic for cyst removal from his scalp and referred for weight management given super morbid obesity history.  Given his age and no previous history of medication supported weight loss in the recent past, today, we discussed dietary/behavior mindfulness methods, exercise and medication support to help get his weight moving in the proper direction. With a BMI of 65, long term there is a high probability of requiring bariatric surgery tools to complement his dietary/medication management to reduce risk of accelerated degenerative skeletal disease and metabolic/vascular, liver/kidney and neoplastic diseases innate to his condition of super morbid obesity.    No previous imaging on file.  He has the following co-morbidities:    No flowsheet data found.  Bowl of cereal/[place]  toast/pancakes for breakfast, monster drink.  First meal of the day 9am.   Will snack through the day with candy. Chips.   Supper is around 6 pm.   Bedtime is 9pm up at 1am. Back to bed around 4am and sleeps until 9am.  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 No snacks/eating at this time.  Lives w/ sisters.      No flowsheet data found.    No flowsheet data found.    No flowsheet data found.    No flowsheet data found.    No flowsheet data found.    No flowsheet data found.    No flowsheet data found.    PAST MEDICAL HISTORY:  Past Medical History:   Diagnosis Date     BMI 50.0-59.9, adult (H) 10/6/2016     CARDIOVASCULAR SCREENING; LDL GOAL LESS THAN 160 10/6/2016     Morbid obesity due to excess calories (H) 10/6/2016     NO ACTIVE PROBLEMS      Obesity 11/17/2008     Tendonitis, Achilles, left 10/6/2016   Plan:  1. Welcome to your weight loss season. To start we'll get rid of the Monster drink and candy snacks/chips during the day. To curb these cravings, we'll start high protein/vegetable intake goal at 3 meals daily. Meals should come every 4.5-6 hours through the day. Given your disrupted sleep pattern, aim for first meal of the day no later than 9-10am and then lunch around 1-3pm then dinner around 6 pm and getting to bed by 9pm.      2. Each meal should have 30 grams of lean protein. If that's too hard, consider using a protein drink in the afternoon, Premiere Protein or Equate Max Protein do a good job and are in most grocery/big box stores.  Goal intake is 100-120grams of protein daily right now.     3. Increase water intake to 100-120oz/day. The combination of increased water and protein should decrease your sweet tooth cravings.    4. 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     5. If covered, we'll ramp up Wegovy:  0.25mg once weekly (I recommend Thursdays if  you have the weekends off) for 4 weeks. Let me know if tolerated well and we'll ramp up then to 0.5mg/week for 4 weeks, then 1mg/week for 4 weeks then 1.7mg/week for 4 weeks and finally 2.4mg/week for 4 weeks. If this medication isn't covered we'll review alternatives.    6. Referral for sleep study given high risk for hypoventilation and sleep apnea in how you hold your weight.    7. Check labs at your convenience this month.    8.recheck with me in 8 weeks and schedule with dietician.    No flowsheet data found.    No flowsheet data found.    No flowsheet data found.    Past Surgical History:   Procedure Laterality Date     NO HISTORY OF SURGERY         Social History     Socioeconomic History     Marital status: Single     Spouse name: Not on file     Number of children: Not on file     Years of education: 6     Highest education level: Not on file   Occupational History     Occupation: student   Tobacco Use     Smoking status: Never     Smokeless tobacco: Never     Tobacco comments:     Father smokes out side   Substance and Sexual Activity     Alcohol use: No     Drug use: No     Sexual activity: Never   Other Topics Concern     Parent/sibling w/ CABG, MI or angioplasty before 65F 55M? No   Social History Narrative     Not on file     Social Determinants of Health     Financial Resource Strain: Not on file   Food Insecurity: Not on file   Transportation Needs: Not on file   Physical Activity: Not on file   Stress: Not on file   Social Connections: Not on file   Intimate Partner Violence: Not on file   Housing Stability: Not on file       MEDICATIONS:   Current Outpatient Medications   Medication Sig Dispense Refill     Semaglutide-Weight Management (WEGOVY) 0.25 MG/0.5ML SOAJ Inject 0.25 mg Subcutaneous once a week for 28 days 4 pens. Will ramp up dose monthly if tolerating well to goal of 2.4mg/week eventually. 2 mL 0     NO ACTIVE MEDICATIONS          ALLERGIES:   Allergies   Allergen Reactions     Nkda [No  "Known Drug Allergies]            PHYSICAL EXAM:  Objective     There were no vitals taken for this visit.  Vitals - Patient Reported  Weight (Patient Reported): 145.2 kg (320 lb)  Height (Patient Reported): 180.3 cm (5' 11\")  BMI (Based on Pt Reported Ht/Wt): 44.63      Vitals:  No vitals were obtained today due to virtual visit.    Physical Exam   GENERAL: Young male, super morbid obesity, thick neck and tired appearing eyes, smiling/cheerful but limited responses to mostly \"yes/no\" answers. and no distress  EYES: Eyes grossly normal to inspection.  No discharge or erythema, or obvious scleral/conjunctival abnormalities.  RESP:No visible retractions or increased work of breathing.    SKIN: Visible skin clear. No significant rash, abnormal pigmentation or lesions. Video visit clarity impairs.  NEURO: Cranial nerves grossly intact.  Mentation and speech appropriate for age.  PSYCH: pleasant.       Andres Dominguez is 27 year old  male who presents for a billable video visit today.    How would you like to obtain your AVS? Mail a copy  If dropped from the video visit, the video invitation should be resent by: Text to cell phone: [phone]  Will anyone else be joining your video visit? No      Video Start Time: 9:45 AM    Are there any specific questions or needs that you would like addressed at your visit today? No concern or complaints          Video-Visit Details    Type of service:  Video Visit    Platform used for Video Visit: Driblet    Video End Time (time video stopped): 10:38 AM    Originating Location (pt. Location): Home        Distant Location (provider location):  On-site    Distant Location (provider location):  [facility] [place]         Sincerely,    Ji Cox MD            Again, thank you for allowing me to participate in the care of your patient.        Sincerely,        Ji Cox MD    "

## 2022-12-29 NOTE — PATIENT INSTRUCTIONS
"Plan:  1. Welcome to your weight loss season. To start we'll get rid of the Monster drink and candy snacks/chips during the day. To curb these cravings, we'll start high protein/vegetable intake goal at 3 meals daily. Meals should come every 4.5-6 hours through the day. Given your disrupted sleep pattern, aim for first meal of the day no later than 9-10am and then lunch around 1-3pm then dinner around 6 pm and getting to bed by 9pm.      2. Each meal should have 30 grams of lean protein. If that's too hard, consider using a protein drink in the afternoon, Premiere Protein or Equate Max Protein do a good job and are in most grocery/big box stores.  Goal intake is 100-120grams of protein daily right now.     3. Increase water intake to 100-120oz/day. The combination of increased water and protein should decrease your sweet tooth cravings.    4. 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     5. If covered, we'll ramp up Wegovy:  0.25mg once weekly (I recommend Thursdays if you have the weekends off) for 4 weeks. Let me know if tolerated well and we'll ramp up then to 0.5mg/week for 4 weeks, then 1mg/week for 4 weeks then 1.7mg/week for 4 weeks and finally 2.4mg/week for 4 weeks. If this medication isn't covered we'll review alternatives.    6. Referral for sleep study given high risk for hypoventilation and sleep apnea in how you hold your weight.    7. Check labs at your convenience this month.    8.recheck with me in 8 weeks and schedule with dietician.      What makes a person succeed with dramatic and sustained weight loss?    It's being at the right point in your life where you feel the need to lose the weight, not because anyone told you so but because of a voice inside of you that says, \"I am ready for this\".  You're now at a point where you may be feeling anxious, irritable and when you look in the mirror " "you do not recognize the person looking back.  Your healthy self is buried somewhere in that reflexion and you want to free it again.  This is the sort of motivation that leads to success, and it comes from you.    Because the only person that can lose that extra weight is you, I like my patients to focus on the mindset of being in Weight Loss Season.  This gives you permission to make the changes necessary to be consistent with the diet/activity and behavior changes that lead to successful, healthy weight loss.  Nearly any diet plan can work for weight loss, but keeping it healthy and nutrient based to prevent deficiencies/hair loss/fatigue or irritability is vital.  If you have a plan you want to try, we'll work with you to make sure no adjustments are needed to keep you healthy through your weight loss season and working with our Bariatric Dieticians you'll be given expert guidance to customize your diet plan to suit your particular needs. If you don't have your own ideas in mind, we are always happy to suggest well researched and validated plans that provide enough food to prevent hunger but still tap into your excess fat reserves and lose weight in a sustainable fashion.  There is great evidence that lean protein/healthy fat intake with good fiber intake while minimizing simple starches/carbs produces reliable/sustainable weight loss in most people. But some feel more connected to an intermittent fasting/fast mimicking or ketogenic diet.  These protocols can be hard for many to stick with and that's why we prefer the protein/healthy fat focused diets but if these alternative strategies appeal to you, we can work with you to optimize your knowledge and results with these tools.    Losing weight is a temporary commitment, but you need to be \"All In\" to have a good weight loss season.  To avoid frustration, you have to be willing to be on track 19 out of 20 days or even better than that. But, weight loss season is " "generally only 4-8 months in length. After that length of time, it can be hard to maintain a negative calorie balance and our brain, motivations and metabolism will usually bring you to a plateau that cannot be broken in this modern world where other commitments start to take priority. That's when we look to stabilize the weight loss you've achieved.  If you've reached your goal by that time, fantastic, and job well done.  If there is more to go, then after a few months of stabilization, we can usually attack that previous plateau and break into new territory.    Because of this time limitation, we want to really get to work right away and get into a sustainable routine ASAP.  One of the best predictors of how much weight you're going to lose throughout the season is how much you lose in the first 6 weeks, so prepare well and jump in with both feet.      Occasionally, people may feel like they cannot commit fully to the changes necessary and may want to change one thing at a time and \"get used to\" the idea of losing weight.  That is OK because that is where they are in their life, and they cannot fully commit for any number of reasons.  It's part of that internal motivation and they just haven't reached PRIORTY NUMBER ONE status yet. It's possible that what they need is more time to reach that point and I am always willing to work with people that want to \"dabble\", but understand, the amount of success obtained with half measures, is much less than half results. Behavior Change cannot occur until we prepare our minds, bodies and environment for what is to come, action!    As you go through your plan, look for things to keep your motivation rolling.  The most successful people have a goal or target/reward that they are working towards.  Having a reward that celebrates your new fitness, mobility and energy is the best sort because it will encourage you to do well with the weight maintenance phase and long term " "lifestyle changes that promotes keeping the weight off for the long term.  Usually, \"getting healthier\" or improving blood tests or losing weight so your clothes fit better is not as internally motivating as having a tangible reward.  A good weight loss season reward is one that isn't food based, is affordable, but something special:  Something you won't be getting/doing unless you achieve your goal.   It s important to keep to the rule of success:  in order to get the reward, your goal MUST be achieved. Write this reward down, where you can look at it daily and keep it in the front of your mind as you go through your weight loss season and it will help keep you on track.    Tools that help change behavior are vital for success. The most studied and most supported tool for weight loss is nothing more than writing down your food and weight every day.  Every Day.  Accurately and completely.  When you commit to weight loss season, this information tells you whether you're getting ENOUGH food to fuel your weight loss properly as well as teaches you the interaction between different foods you eat and how your body responds with weight loss.  You'll see that sometimes after a heavy workout you don't see the scale move until 2-3 days later.  How saltier meals (chili for example) may make you retain water for 4-5 days before you see the weight come off, you'll get used to the mini-plateaus that develop after a good 3-8 lb drop in weight as well as how you break through if you keep working the diet as you should.    Weight loss is not a linear process, there are mini ups and downs.  Learning how your body loses weight and getting comfortable with that is very rewarding. The act of writing words on paper also solidifies your will power and commitment to the season of weight loss and that by itself changes your brain chemistry/appetite, motivation and prepares you for maximal success.     Behavior change is all about getting " into a new routine.  The old habits and routine have to change because without changing the circumstances of how you gained your weight, it's unlikely you'll enjoy satisfying results. If you have snacking habits, like every time you walk through the kitchen you grab a little something, well, that habit has to change and be replaced by a new habit.  It can be something as simple as keeping a doodle pad on the counter that you make a few scribbles and then walk through the kitchen having not opened the cupboard, or starting with a glass of water and leaving the kitchen without anything else, or checking your food journal to see how many calories you have left for the day.  Boredom is the enemy as are the old habits. Break new ground and try to push those old habits into a deep hole.  There are apps/counseling options available that can help with some of the day to day urges/behaviors if you're struggling. One commercial product that does a good job is Noom.  Unfortunately, there is a subscription fee.    Finally, exercise always helps.  While not mandatory to lose weight, every little bit helps and exercise has so many other benefits that to not work it into your plan is to miss out on all the mood, sleep, stress and general health benefits that come from making yourself a little short of breath and sweaty at least 3-4 days out of the week.  The metabolism and calorie burn benefits aside, almost every chronic ailment in medicine gets better with proper, aerobic exercise.  Allow yourself to start slow and let your body prepare itself to accept harder training 4-6 weeks down the road, but start now and commit to a plan.  Whether you have the means to hire a , join a gym or just walk out your front door or go down to your basement for a video workout, get into a exercise routine and  after 3 weeks of at least 3 times a week exercise you should be at a point where you can slowly start ramping up 10% each week to  "our goal of at least 150-300minutes weekly of aerobic exercise and at least twice weekly resistance training/strenghtening with weights/bands or body weight exercises.     I am a big fan of modifying the free training plan, \"Couch to 5k\", for almost all of my patients. Just type it into [employer] or look it up on your smart phone christian store.  To modify the plan,  you can use the training plan for whatever aerobic activity you do (bike/treadmill/elliptical/rower/pool/etc). During the \"jog\" intervals, you just move a little faster or harder or increase the tension or incline.  You use those little intervals to switch up the workout and recruit more muscles and pump the blood a little more and then recover again in the \"walk\" intervals by slowing down, decreasing the incline or turning down the tension.  3-4 days a week is not that much to ask and the benefits are enormous.  Start slow and develop the base from which you can then build on and reduce the risk of injury.  It's much more important 2-4 months from now to be enjoying your exercise then it is to over exert yourself at the start and hurt yourself.  Starting slowly allows your body to accept the training better down the road when the exercise becomes crucial for weight maintenance.  Without exercise down the road during your maintenance phase, all this hard work you are about to put in can be undone. It usually takes about 100-300 calories a day of exercise to maintain a weight loss and our focus during weight loss season is to generate the routine/activities and hobbies that make that enjoyable/sustainable.    Thanks for taking this first and most important step in your weight loss season.  Commit to it and we will cheerlead you all the way to success.  When things get tough or off track we'll offer guidance and analysis and when you reach your goal we'll celebrate your success.  In the end, it is all about your success, your health and what you do with " it.      Ji Cox MD  [facility]  [phone]          LEAN PROTEIN SOURCES  Getting 20-30 grams of protein, 3 meals daily, is appropriate for most people, some need more but more than about 40 grams per meal is not useful.  General rule is drinking one ounce of water per gram of protein eaten over the course of the day:  70 grams of protein each day, drink 70 oz of water.  Protein Source Portion Calories Grams of Protein                           Nonfat, plain Greek yogurt    (10 grams sugar or less) 3/4 cup (6 oz)  12-17   Light Yogurt (10 grams sugar or less) 3/4 cup (6 oz)  6-8   Protein Shake 1 shake 110-180 15-30   Skim/1% Milk or lactose-free milk 1 cup ( 8 oz)  8   Plain or light, flavored soymilk 1 cup  7-8   Plain or light, hemp milk 1 cup 110 6   Fat Free or 1% Cottage Cheese 1/2 cup 90 15   Part skim ricotta cheese 1/2 cup 100 14   Part skim or reduced fat cheese slices 1 ounce 65-80 8     Mozzarella String Cheese 1 80 8   Canned tuna, chicken, crab or salmon  (canned in water)  1/2 cup 100 15-20   White fish (broiled, grilled, baked) 3 ounces 100 21   [place]/Tuna (broiled, grilled, baked) 3 ounces 150-180 21   Shrimp, Scallops, Lobster, Crab 3 ounces 100 21   Pork loin, Pork Tenderloin 3 ounces 150 21   Boneless, skinless chicken /turkey breast                          (broiled, grilled, baked) 3 ounces 120 21   [place], [place], [place], and Venison 3 ounces 120 21   Lean cuts of red meat and pork (sirloin,   round, tenderloin, flank, ground 93%-96%) 3 ounces 170 21   Lean or Extra Lean Ground Turkey 1/2 cup 150 20   90-95% Lean [place] Burger 1 nelson 140-180 21   Low-fat casserole with lean meat 3/4 cup 200 17   Luncheon Meats                                                        (turkey, lean ham, roast beef, chicken) 3 ounces 100 21   Egg (boiled, poached, scrambled) 1 Egg 60 7   Egg Substitute 1/2 cup 70 10   Nuts (limit to 1 serving per  "day)  3 Tbsp. 150 7   Nut Follansbee (peanut, almond)  Limit to 1 serving or less daily 1 Tbsp. 90 4   Soy Burger (varies) 1  15   Garbanzo, Black, [place] Beans 1/2 cup 110 7   Refried Beans 1/2 cup 100 7   Kidney and Lima beans 1/2 cup 110 7   Tempeh 3 oz 175 18   Vegan crumbles 1/2 cup 100 14   Tofu 1/2 cup 110 14   Chili (beans and extra lean beef or turkey) 1 cup 200 23   Lentil Stew/Soup 1 cup 150 12   Black Bean Soup 1 cup 175 12             How much activity does it take to burn off the occasional treat?  Occasional treats or indulgence doesn't have to set back your weight loss season goals. But if the occasional treat turns into the daily chocolate habit or the chips after dinner, or a can of soda, then your progress will slow dramatically unless your activity and exercise can compensate for those empty, low nutrition/high calorie foods.  Here's some estimations of walking off your snacks depending on general weights.  You can use \"calorie burn calculators\" if you search in [employer], more accurate estimations should ask height/weight/gender and if you have a smart watch/heart rate monitor fitness watch, then your personal burn rate will be much more accurate then these general numbers. But these are in the ballpark.  [email or web address] is a nice reference for many different activities with just entering weight and time spent doing anactivity.    Most pre-packaged snacks/treats or 12 oz cans of soda come in around 150 kcal (small bag of chips (11-12 chips), 2 Tollhouse Cookies, 12 oz Coke, 5 oz of wine(125 kcal), 2 oz Vodka (130kcal),etc).  To burn off this snack/indulgence, walking at a \"walking the dog\" pace or for most people that aren't in training/fitness mode, is about 2.5 MPH. If you move slower than this, you'll burn less calories. If you move faster than this,you'll burn more.     Calories burned are for a 30 minute walk, at 2.5 MPH (traveling 1.25 miles in " "30 minutes):    Body Weight Calories Burned   175 lbs 120 kcal   200 lbs 137 kcal   225 lbs 153 kcal   250 lbs 171 kcal   300 lbs 204 kcal       Speed, intensity, hills and activity type (walk/bike/swim/chores/yardwork/etc) will determine how much energy you burn per hour.  As you can see, for most people, a small snack of 150 kcal is a 30 minute commitment to moving at a modest pace.  A quick stroll. In comparison, most people moving at a \"missing the bus\" walk pace of about 4 mph (or the pace that a fit person may  walk in a marathon race if they can't run anymore).    30 minutes at 4 MPH speedy walk/slow jog on level ground burn rate (2 miles covered in 30 minutes):    Body Weight Calories Burned   175 lbs 198 kcal   200 lbs 227 kcal   225 lbs 255 kcal   250 lbs 283 kcal   300 lbs 340 kcal.     Finding loops in your neighborhood is easy to map out distances by using sites like, MapMyRun.com, MapMyRide.com, or Strava.com.    Get out there and earn it, burn it, or pay it forward.  Banking calories is always a good idea if you know an occasion is coming up where you plan to indulge. The goal for all adults around the work is at [id number] minutes weekly of moderate aerobic activity like those listed above (keeping heart rate at about 50-65% of your maximum heart rate calculation (roughly 220-Age in years (as long as not on heart slowing medicationslike Beta Blockers)).     Hopefully, this drives home the point that snacks need to be intentional during weight loss season as most of us struggle with finding 30-60minutes most days a week to exercise and it takes more than that in many cases to make up for the sweet/treats and indulgences that may have contributed to your weight gain over the years (roughly, a can of soda daily for a year will put a person at risk for a 10-15 lb weight gain each year).        MEDICATIONS FOR WEIGHT LOSS  There are several medications available to assist us in weight loss.  By " themselves, without a mindful change in diet and increase in movement/activity these medications are disappointing in their results. However, combined with a closely monitored program of diet change and exercise they can be very effective in controlling appetite and boosting initial weight loss.  All weight loss medications need continual re-evaluation for efficacy as their side effects and health benefits fail to be worthwhile if a person is not continuing to lose weight or in maintaining their healthy weight.  Some weight loss medications are scheduled drugs, meaning there is at least a theoretical possibility for developing addiction to them, but in practice this is rare.  We do anticipate coming off meds in the future- after stabilization of weight loss is assurred.  Finally, a tolerance can develop and people s perceived efficacy of medication can diminish.  In communication with your physician, it may be appropriate to intermittently take a break from these medications and then restart again (few weeks off then restart again) if a plateau is reached that cannot be broken through.  Each person can respond to a medication differently and to be a good option for you, it will need to be affordable, effective and well tolerated with minimal side effects.    In most cases, weight loss progress after one month and three months will be obtained and if a patient is not reaching the satisfactory progress towards weight loss, the medications may be discontinued.  The thought is that if a person is taking a weight loss medication and not receiving the potential health benefit of that drug, the side effects are not worthwhile and use should be discontinued.  On the flip side, there are many people on some weight loss medications for years because it continues to be an effective tool in their weight management and they are tolerating the medication without any long-term side effects.  Each person's response and purpose will  be evaluated.      PHENTERMINE (Adipex): approved in 1959 for appetite suppression.  It has stimulant effects and cannot be used with Ritalin, Concerta, or other stimulants.  Although it is not highly addictive, it's chemically related to amphetamines which are addictive and is classified as a Controlled Substance by the [place].  Occasional dependence can develop, but rarely. The most common side effects are dry mouth, increased energy and concentration, increased pulse, and constipation.  You should not take phentermine if you have glaucoma, hyperthyroidism, or uncontrolled/untreated hypertension or overly anxious. You should stop if dramatic mood swings, severe insomnia, palpations, chest pains, visual changes or if your Blood Pressure is consistently elevated or any time it's over 160/90.   It's ok to go off the med for a few weeks and restart if efficacy is wearing off.  $24-$30 for 90 tablets at [facility]. Females are required to have reliable birth control to reduce the risk birth defects/miscarriage.      TOPIRAMATE (Topamax): Anti-seizure medication, also used to prevent migraines and sometimes for mood stabilization.  Side effects include paresthesia, glaucoma, altered concentration, attention difficulties, memory and speech problems, metabolic acidosis, depression, increase in body temperature and decrease sweating, risk of kidney stones.  Do not take Topamax while taking Depakote as this can cause high ammonia levels.  You must have reliable birth control as Topamax can cause birth defects.  If prolonged use has occurred it should be tapered off slowly to avoid withdrawal issues.  Insurance usually covers Topiramate.  At higher doses, there may be some confusion/forgetfulness associated with this so we try to limit dose to under 75mg twice daily to reduce this risk. Often covered by insurance as it's used for many reasons.  Topamax will cause carbonated beverages to taste bad. A recheck of your  kidney/electrolytes may occur within a few months of starting.    QSYMIA (Phentermine + Topamax):  See above information about phentermine and Topamax.  Most common side effects are paresthesia, dizziness, distortion of taste, insomnia, constipation, and dry mouth.  See above descriptions for the two individual agents.Females are required to have reliable birth control to reduce the risk birth defects/miscarriage.  $150-$220 per month      GLP1 Agonists:  Liraglutide (Victoza/Saxenda), Semaglutide (Ozempic/Wegovy):   Part of the family of Glucagon Like Peptide Agonists, these medications directly suppresses appetite and are often used by diabetic patients due to improvements in glucose/insulin balance.  They also slow how quickly the stomach empties, increasing fullness. They may be hard to get covered for non diabetics and some plans have exclusions for weight loss purposes.  Currently, these are  injectable medications delivered via autoinjector pen. It can be very costly without insurance coverage (over $500/month).  Small risks for pancreatitis exists and dose should be held if increased mid abdominal pain/burning. It is not to be used if previous Multiple Endocrine Neoplasia. In rodents, may increase risk of thyroid tumors and not indicated for anyone with a history of medullary thyroid cancer as a result.  If changes in voice/swallowing should be discontinued. Reliable birth control required in women. Saxenda.[email or web address], Wegovy.com has more information on these medications.    Contrave (Bupropion/Naltrexone).    Synergistic combination of a mild appetite suppressing anti-depressant (Bupropion) whose effects are increased due to interaction with Naltrexone.  Naltrexone may have some effects on craving and is often used in addiction medicine to help previous opiate addicts be less prone to relapse as it blocks the action of opiates. Should be stopped if any need for opiate pain medication, surgery or planned procedures  "where you'll be given sedation/anesthesia. If prolonged use, recommend stepping down bupropion over 2-3 weeks to limit any risk of withdrawal issues. Side effects may include dry mouth, increased heart rate, mild elevation in Blood pressure;  dizziness, ringing in the ears, anxiety (typically due to bupropion), nausea, constipation, and some get fatigued with naltrexone.  About $210 on [facility] for 120 tabs of \"Contrave\", the brand name without insurance coverage. Generic Bupropion 75mg: $25 for 120 tabs, Naltrexone: $55 for 90 tabs without insurance coverage on [employer] Cannot be used if pregnant/trying to conceive or breast feeding.      Plenity:   By mail order pharmacy only, moderately expensive. $98/month.  2-3 capsules taken with 16 oz of water, 20 minutes before 2 meals daily provides crystals that expand into a gel that fills the stomach 20-25% and provides a mechanical fullness.  The Plenity then mixes with your next meal and increases satisfaction by bulking the meal up to feel bigger than it truly is. Plenity is not absorbed and gets passed through the digestive tract and excreted in stools. May cause some bloating/gas/full feeling as it behaves like a fiber in many ways. Cost not available yet. FDA cleared in March of 2019 and became available near the end of 2020 through mail order pharmacy only ([facility]). The safety and efficacy trials were done under \"Gelesis\" name. Not appropriate for people with stomach or bowel motility issues as requires you to pass it through digestive tract. [email or web address].com has more information.      50 Things to do Instead of Snacking  We'll all have urges to snack sometimes. Hunger, mood, stress, boredom and distraction are common triggers so being mindful and thinking about what is driving a particular urge to snack at a particular time can be helpful for reducing the urge in the future.  Remember, the urge to snack doesn't have to be obeyed. The urge exists, but you can " watch it pass. Over time, you will get good at the exercise of experiencing an urge, acknowledging it, but letting it pass you by and float away.  Until you get there, here are some activities to pass the 3-5 minutes that most urges last. Good hydration is always helpful.  If you do struggle with impulse/urge control, consider some therapy based on cognitive behavioral therapy or ACT (Acceptance and Commitment Therapy).  Apps/subscriptions like [employer] emphasize some of these tools and can be of help for those able to afford the christian/subscription.  1. Imagine the new healthier you   2. Walk around the block   3. Call a friend   4. Make a list of your Top Ten Reasons to Lose Weight   5. Make a To Do list   6. Turn on music and dance   7. Jot a thank you note to someone   8. Go to bed early or take a nap  9. Read a book   10. Blog or journal  11. Give yourself a manicure or pedicure   12. Plan a healthy meal for your family   13. Surf the Internet   14. Finish an unfinished project   15. Walk your dog, pet your cat, feed your fish  16. Brush your teeth   17. Balance your checkbook   18. Say a prayer   19. Chop veggies for later use.  20. Give a massage   21. Clean out a junk drawer   22. Play a game with your kids   23. Try a new route on your walk     24. Drink a glass of water   25. Kiss someone   26. Try on some of your clothes   27. Look at old pictures   28. Rent a video   29. Wash your car   30. Take a hot, soothing bath   31. Update your calendar   32. Work in your yard   33. Start your holiday shopping list   34. Count your blessings   35. Write a letter   36. Fold some laundry   37. Check your e-mail   38. Give your dog a bath   39. Send a birthday card   40. Meditate   41. Hug someone   42. Rearrange some furniture   43. Light a fire or some candles   44. Put your pictures in an album   45. Plan a trip (real or imaginary)  46. Straighten a closet   47. Clean out a files   48. Visit a friend  49. Clean out your  trunk  50. Do something nice for someone         Example Meal Plan for a 2653-5334 Calorie Diet:  Andres, increase portions by 30% to hit a 2000kcal/day goal for now with your 100-120g/day protein goal.    In order to fuel your weight loss properly and avoid hunger-induced overeating later in the day, for your height and weight, you will enjoy the most success by following the diet below or similar with adjustments based on your particular tastes and preferences.  Exercise may influence speed, amount of weight loss further.     I recommend getting into a meal routine and keeping it similar day to day in the beginning so you don t have to think too hard about what you re going to make/eat.  Keep snacks healthy, ideally containing protein and some vegetables.  Non-processed food is preferable to packaged items.  Eat at least a few crunchy green vegetables if having a snack, which should be 2-3 hours after your mealtimes(prepare these ahead of time for ease of use).  Drink 64 oz -80 oz of water daily for most, some of you will need more and we'll discuss it at your visit if that is the case.      When changing our diet,  we can often mistake thirst for hunger or just have some distracted eating habits that we need to break free from ('bored/mindless eating', screen time,work, driving,etc).  A glass of water and reconsideration of our hunger is often all that is needed.  Having the urge is not the problem, but watching it pass by without acting on it is the goal.    If you re having hunger problems, add a protein drink/snack to your morning hours or afternoon snack with at least 20grams of protein and not too much sugar (under 10g).  A carton of higher protein/low sugar yogurt can work as well.  If the urge to snack is overwhelming and not satiated, try going for a 10 minute walk/exercise, come home and drink a glass of water and if still hungry, have a  calorie snack (handful of raw/sprouted nuts, veggies and  string cheese, protein bar, etc).  Savor it.    It is better to have a large breakfast, a moderate lunch and a smaller dinner to fuel your day.  People lose 10-15% more weight during their weight loss season with this strategy. Optimizing your protein intake at each meal will further keep you more satisfied while eating less food overall.  Getting exercise in early has also been shown to offer the best results (before breakfast ideally but anytime is the right time to exercise if that is not an option for you).    To make sure you re getting adequate vitamins and minerals during weight loss, I recommend one complete multivitamin a day of your choice.  Consider a probiotic and taking some vitamin D 2000 IU daily.    Let supper be your last meal of the day and ideally try to have at least 12 hours between supper and breakfast the next day to tap into some beneficial overnight fasting dynamics.  Midnight snacks need to go away. Water in the evening is fine, unsweetened, non caffeinated herbal tea is helpful as well.  Consolidating your meals within a 8-12 hour period of your day will help tap into these additional metabolic benefits and tends to keep your appetite up for breakfast, further helping to stay on track.  For most of my patients, I don't recommend an intermittent fasting style diet (many find it hard to fit in their lifestyle) but an overnight fast is very doable for most patients and helps regulate our hunger drives a little better.  This makes it very important to nail good intake at all three meals to feel satisfied/energized and still lose weight.      If evening snacking desires are high, consider a glass of fiber supplement for some additional fullness (metamucil or similar). Most of us don't get the 25-30 grams per day of fiber that promotes good gut health/satiety.  Benefiber, metamucil, citrucel are reasonable/affordable options for most people.  Inulin, chicory, psyllium husk are reasonable options  but start slow and low in the dose to avoid gas/bloating until your gut gets acclimated (ramping up to 5-10 grams per day of supplemental fiber after 3-4 weeks if needed).      Example Meal Plan:  Breakfast: 450-475 Calories  1 egg cooked on low in olive oil:   calories.  5oz Greek Yogurt (Fage plain classic: ~150 dustin)  Handful of Berries of your choice (about a calorie per berry or 20-40cal per handful)    cup(cooked) of  old fashioned oatmeal or 1/2 cup(cooked) steel cut oats. (150 dustin)  Sprinkle amount of brown sugar and a pat of butter. (40 dustin)  Glass of  Water  Black coffee or unsweetened Tea (0calories).      2-3 hours Later Snack: (195 calories).  Glass of water  One string Cheese (80 calories) or 4 oz creamed cottage cheese (115 calories) with  Crunchy Celery sticks (less than 10 calories per large stalk) 2 stalks. (20 calories)    of a  Large Banana or   of a Large Apple (60 calories):  eat second half at lunch or afternoon snack.     Lunch:300 -350 calories   Chicken Breast  (baked/broiled/roasted/grilled)  4-6 oz.  (125-180 dustin), BBQ sauce/hot sauce/mustard/seasoning is free. Just use a reasonable amount. Or a can of tuna with 1 tablespoon mayonnaise.  Salad: lettuce, any other veggies (cucumbers, green peppers/celery you like and a small drizzle of dressing to just flavor.  Go as big on the veggies as you like,  as they are practically calorie free.   A whole, 8 inch cucumber is 45 calories, a whole green pepper is 23 calories, a stalk of celery is 9 calories.  [place] Dressing is 60 calories per tablespoon..so moderate your desired dressing or do a drizzle of olive oil and splash of balsamic vinegar on top,  Total calories unlikely to be over 150 even with dressing.  Glass of Water.    Option for lunch is meal replacement protein drink/smoothie.  Need at least 20 grams of protein and eat the rest of your apple/banana from the morning snack.      Afternoon Snack: 150-200 calories   Cheese  Stick or cottage cheese again  and a fresh fruit OR  Granola Bar (protein Bar acceptable if under 200 calories OR  Homemade smoothies:  8oz skim milk,  a handful of berries (fresh or frozen and a serving of protein powder such as BiPro or Joselin sWhey for example.  If you don't like dairy, make with 8oz water, one small banana, handful of berries and the protein powder, add any veggies you want as well:  roughly 200 calories.   Glass of Water    Dinner: 325 calories  4oz of fresh, Atlantic salmon.  Broiled (salt/pepper/dill) for about 8-8.5 minutes (200calories) or  4oz filet mignon steak or sirloin steak  Salad or vegetable sautéed lightly in olive oil or   Broccoli 1.5  cups chopped and steamed  or micro-waved in a little water (75 calories)  Glass of Water,    Cup of herbal tea (unsweetened, caffeine free)      Herbs and seasonings are encouraged to flavor your foods/vegetables.  Make your food delicious.      Tips for Success:  1.  Prepare proteins ahead of time (broil chicken breasts in bulk so you can grab and go), steel cut oats/lentils can be stored in casserole dish/bowl in the fridge for quick scoop in the morning and rewarm in microwave, make use of crock pot recipes (watch salt content).  Making meals that cover 3-4 future meals is an easy way to stay on track.  2.  Drink a 8-12 oz glass of water every 2-3 hours when awake.  We often mistake hunger for thirst, especially when losing weight.  3. Remember your Reward and Motivation when things get hard.  4.  Weigh yourself every morning and record, you'll stay on track better and learn how our biorhythms, diet and elimination patterns show up on the scale. Don't worry about 1 or 2 day patterns, but when on track you'll notice good trend downward of weight over 3-4 day segments.  Plateaus tend to resolve after 4-8 days in most cases if you stay consistent with your plan.  These are natural and part of weight loss, even if you're perfect with your plan  "execution.  5. Call if problems/concerns.  [employer] is a great tool to stay in touch and provide weekly outside accountability. Check in with questions or if you want to brag.  6.  Find a handful of meals/foods that keep you on track and feeling good and get into a routine that is sustainable for you.  It's OK to have a routine that works for you.  7.  Consider taking a complete multivitamin just to make sure all micronutrients are adequate during weight loss.  8. If losing hair/brittle nails it usually means you are not taking enough protein.  Minimum goal is 60 grams daily of protein for smaller women, 80 grams a day for men. Consider taking Biotin as supplement or a \"Hair and Nail\" multivitamin.          On-the-Go Breakfast Ideas  As of 2015, the latest research shows what a huge impact eating breakfast has on losing weight and feeling your best. People lose more weight when they make breakfast their biggest meal of the day compared to Dinner, but even if you cannot go to that degree, getting a breakfast that has at least 20 grams of protein and even a moderate amount of fat is ideal for maintaining good energy through the day and limits overeating in the evening hours.  The following are some quick and easy suggestions for at least getting something of substance into your body in the morning.  Enjoy!    Eating breakfast within 90 minutes of waking up is an important part of taking care of your body on a restricted calorie diet plan.  After sleeping for hours, your body is in need of fuel.  An ideal breakfast is a combination of protein, whole-grain carbohydrates, or fruit.  Here s why:    -Protein digests very slowly in the body, helping you feel more satisfied.  -Whole grains provide dietary fiber, which also digests slowly and helps keep your gut clean.  -Fruit is a great source of vitamins, minerals, and fiber.     Each one of these breakfast combinations has between 200-300 calories and 15-20 grams of " protein.  Feel free to mix and match!    Bone Broth (chicken bone broth or beef bone broth) is a great way to boost protein content. 8oz of bone broth will typically have 9-12grams of protein for 40kcal of energy.    Protein: Choose  -1/2 cup low-fat cottage cheese  -2 hard boiled eggs , or one cooked in olive oil (low/slow heat).  -1 low fat string cheese stick  -1 Tablespoon natural peanut butter  -[facility] TabUp vegetarian sausage nelson (found in freezer section)  -1 slice lowfat cheese  -6 oz 2% or lowfat Greek yogurt, such as Fage or Oikos.    PLUS    Whole Grains:  Choose   -1 whole wheat English muffin  -1 whole wheat becka, half  -1/2 Fiber One frozen muffin, thawed  -1/2 Fiber One toaster pastry  -1 whole wheat bagel thin  -1/2 cup Kashi cereal  -1 Kashi waffle (or other whole grain high-fiber waffle)  Aim for whole grain/sprouted breads with at least 3g of fiber/slice if having bread. [place] is one such brand.    OR    Fruit: Choose  -1/3 cup blueberries  -1/2 banana (or a plantain- similar to a banana, yet smaller)  -1/2 cup cantaloupe cubes  -1 small apple  -1 small orange  -1/2 cup strawberries  -handful raspberries/blackberries (each berry is about 1 calorie).    *Adapted from Diabetes Living, Fall 20    Ten Breakfasts Under 250 calories    Ideally, getting between 350-600 calories  (depending on starting height and weight)for breakfast is ideal for avoiding hunger later in the day, adjust/add to the following accordingly:    One- 250 calories, 8.5 g protein  1 slice whole-grain toast   1 Tbsp peanut butter    banana    Two- 250 calories, 8 g protein    cup nonfat/lowfat yogurt  1/3rd cup diced no-sugar peaches  1/3rd cup cereal (like Special K, Cheerios, or bran flakes)    Three- 250 calories, 25 g protein  1 egg scrambled with 1 oz skim milk    cup shredded cheddar    whole grain English muffin  1 oz [place] chavez  1 tsp margarine spread    Four- 225 calories, 25 g protein  1/2 cup Kashi  Go-Lean cereal    cup skim milk mixed with 1 scoop Bariatric Advantage protein powder    cup no-sugar diced pears    Five- 250 calories, 20 g protein    cup oatmeal prepared with skim milk, 1 scoop protein powder, and sugar-free maple syrup    Six- 200 calories, 5 g protein  1 whole grain waffle, toasted  1 tablespoon creamy peanut or almond butter    Seven-  250 calories, 19 g protein  Breakfast sandwich: 1 slice whole grain toast, cut in half.  Add 1 scrambled egg and one slice cheddar  cheese.    Eight-  250 calories, 15 g protein  2 eggs scrambled with 1/3 cup frozen spinach (heat before adding to eggs) and 2 tablespoons low fat cream cheese.    Nine-  150 calories, 15 g protein  2/3rd cup cottage cheese    cup cantaloupe    Ten- 200 calories, 20 g protein  Fruit smoothie made with 4 oz. nonfat Greek yogurt,   cup berries, 1 scoop protein powder, and 4 oz skim milk.    Ten Lunches Under 250 Calories    Aim for lunch to be around 300-400 calories a day when trying to lose weight and get that protein in!    One- 200 calories, 11 g protein  1/3 cup tuna salad made with light sadler on 1 slice whole grain bread  1 small peeled apple    Two- 250 calories, 16 g protein  1/3 cup lowfat cottage cheese    cup cooked green beans    small fruit cocktail (in natural juice)    Three- 200 calories, 11 g protein    grilled cheese sandwich on whole grain bread with lowfat cheese  2/3rd cup of tomato soup    Four- 250 calories, 22 g protein  Deli wrap: 1 oz sliced turkey, 1 oz sliced ham, 1 oz sliced chicken rolled up with 1 slice low-fat cheese  1 small orange    Five- 250 calories, 28 g protein  2/3rd cup chili with 1 oz shredded cheese  4 saltine crackers    Six- 250 calories, 22 g protein  1 cup fresh spinach with 2 oz chicken, 1/3rd cup mandarin oranges, and 2 tablespoons sliced almonds with 1 tablespoon  vinaigrette dressing    Seven- 200 calories, 11 g protein  1 Tbsp sugar-free preserves and 1 Tbsp peanut butter on 1 slice  whole grain toast    cup nonfat/lowfat Greek yogurt    Eight- 250 calories, 18 g protein  1 small soft-shell chicken taco with 1 oz shredded cheese, lettuce, tomato, salsa, and 1 Tbsp light sour cream    cup black beans    Nine- 225 calories, 13 g protein  2 ounces baked chicken  1/4 cup mashed potatoes    cup green beans    Ten- 200 calories, 21 g protein  Deli becka: 2 oz roast beef or other deli meat with 1 tsp Ernesto mayonnaise and sliced tomato, onion, and lettuce  1/3rd cup cottage cheese      Ten Dinners Under 300 calories    If you're eating a large breakfast and medium lunch, keep dinner small.  300-400 calories is ideal for most people depending on their caloric needs.    One- 300 calories, 12 g protein  1-inch thick slice of turkey meatloaf    cup baked butternut squash    Two- 200 calories, 9 g protein  Bread-less BLT: 3 slices turkey chavez, sliced tomato, wrapped in a large lettuce leaf    cup peeled fruit    Three- 275 calories, 36 g protein  3 oz roasted chicken    cup cooked broccoli    cup shredded cheddar cheese    cup unsweetened applesauce    Four- 200 calories, 25 g protein  3 oz baked tilapia  1/3rd cup cooked carrots    cup yogurt    Five- 250 calories, 20 g protein  Grilled ham  n  Swiss: spread 2 tsp ghee or butter on 1 slice of whole grain bread.  Cut bread in half, layer 2 oz deli ham with 1 piece of Swiss cheese and grill until cheese is melted.    cup cooked vegetables    Six- 250 calories, 18 g protein  Vegetarian cheeseburger: 1 [place] cheeseburger topped with lettuce, onion, tomato, and ketchup/mustard    cup sweet potato fries    Seven- 250 calories, 18 g protein  Pork pot roast: 2 oz roasted pork loin, 1/3rd cup roasted carrots,   medium potato, cooked with   cup gravy    Eight- 330 calories, 25 g protein  2 oz meatballs (about 2 small meatballs)    cup spaghetti sauce  1/2 piece toast topped with 1 tsp ghee or butterand topped with garlic powder, toasted in oven    Nine- 250 calories,  16 g protein  Mexican pizza: one 8  corn tortilla topped with 2 oz chicken,   cup salsa, 2 tablespoons black beans, 2 tablespoons shredded cheese.  Bake until cheese is melted.    Ten- 250 calories, 22 g protein  Shrimp stir-hare: 3 oz cooked shrimp, 1/6th onion,   pepper,   cup chopped carrots sautéed in 1 tablespoon olive oil, topped with 2 tablespoons stir hare sauce and a pinch of sesame seeds        150 Calories or Less Snack Ideas   1 hardboiled egg with   cup berries  1 small apple with 1 hardboiled egg  10 almonds with   cup berries  2 clementines with 1 light string cheese  1 light string cheese with   sliced apple  1 light string cheese wrapped in 2 slices of turkey  4 100% whole wheat crackers (e.g. Triscuit) with 1 light string cheese    c. cottage cheese with   cup fruit and 1 Tbsp sunflower seeds     cup cottage cheese with   of an avocado     can tuna fish with 1 cup sliced cucumbers     cup roasted garbanzo beans with paprika and cayenne pepper    baked sweet potato with   cup chili beans or   cup cottage cheese  2 oz. nitrate free turkey slices with 1 cup carrots  1 container (6 oz) of low sugar (less than 10 grams of sugar) greek yogurt   3 Tablespoons of hummus with 1 cup sliced bell peppers   2 Tablespoons of hummus with 15 baby carrots  4 Tablespoons ranch dip made with plain Greek Yogurt and 3 mini cucumbers  1/4 cup nuts (any kind)  1 Tablespoon peanut butter with 1 stalk celery   1 dill pickle wrapped in 1-2 slices of deli ham with 1 tsp of mayonnaise/mustard.

## 2023-01-02 ENCOUNTER — TELEPHONE (OUTPATIENT)
Dept: SURGERY | Facility: CLINIC | Age: 28
End: 2023-01-02

## 2023-01-02 NOTE — TELEPHONE ENCOUNTER
Prior Authorization Retail Medication Request    Medication/Dose: Wegovy 0.25mg/0.5ml PF PEN INJ  Rationale:     Andres is a patient with early onset class III, super morbid obesity with significant element of familial/genetic influence and without current health consequences that are known, but high risk for sleep apnea and fatty liver disease given his central adiposity/thick neck and fatigued affect. He does need aggressive weight loss plan due to high risk for multi-organ dysfunction/metabolic disease/degenerative skeletal issues over the next 5-10 years.      Insurance Name:  [employer]  Insurance ID:  [id number]    Pharmacy Information (if different than what is on RX)  Name:  [facility]  Phone:  [phone]

## 2023-01-03 ENCOUNTER — TELEPHONE (OUTPATIENT)
Dept: SURGERY | Facility: CLINIC | Age: 28
End: 2023-01-03

## 2023-01-03 NOTE — TELEPHONE ENCOUNTER
Sent patient a text message video link to connect. Called patient when he was not connected for the video nutrition visit. Left voicemail instructing patient to connect or to call the clinic (phone number provided) to reschedule appointment.    Tania Maciel RD, LD

## 2023-01-03 NOTE — TELEPHONE ENCOUNTER
PA Initiation    Medication: Wegovy 0.25mg/0.5ml PF PEN INJ  Insurance Company: [employer] - Phone [phone] Fax [phone]  Pharmacy Filling the Rx: [id number] [place] [id number] AT [facility] & [address] [place] [address]  Filling Pharmacy Phone: [phone]  Filling Pharmacy Fax: [phone]  Start Date: 1/3/2023

## 2023-01-04 ENCOUNTER — TELEPHONE (OUTPATIENT)
Dept: SURGERY | Facility: CLINIC | Age: 28
End: 2023-01-04

## 2023-01-04 NOTE — TELEPHONE ENCOUNTER
PRIOR AUTHORIZATION DENIED    Medication: Wegovy 0.25mg/0.5ml PF PEN INJ    Denial Date: 1/3/2023    Denial Rationale: Patient has to first try phentermine for at least 12 weeks.            Appeal Information: If provider would like to appeal this decision we will need a detailed letter of medical necessity to start the process. Then re-route this request back to the PA pool.

## 2023-01-04 NOTE — TELEPHONE ENCOUNTER
Left message regarding need for trial of phentermine before insurance will approve Wegovy trial. Given that he didn't  phone, advised he call us back if interested in a trial and I'll send him information regarding phentermine trial/dosing and potential side effects. He can contact me through [employer] or our nurse line if interested in starting up medication.  Ji Cox MD

## 2023-08-15 ENCOUNTER — TELEPHONE (OUTPATIENT)
Dept: SURGERY | Facility: CLINIC | Age: 28
End: 2023-08-15
Payer: COMMERCIAL

## 2023-08-15 NOTE — TELEPHONE ENCOUNTER
Spoke with Bonnie (mother) today after she left a message to reschedule surgery for Andres.    I let Bonnie know that this order was from November of last year () and that we will need a new order from the provider. I will have the request sent today but the provider is not back in office until next week. She will get a call from us next week regarding scheduling.      Bonnie also asked about a weight loss injectable medication that was previously prescribed but also . I have routed a message to Dr. Cox  and his team as well to inquire about this. Told her that someone should be giving her a call to help them get that medication question worked out.     Bonnie understood and agreed to the plan above.

## 2023-08-16 ENCOUNTER — VIRTUAL VISIT (OUTPATIENT)
Dept: SURGERY | Facility: CLINIC | Age: 28
End: 2023-08-16
Payer: COMMERCIAL

## 2023-08-16 VITALS — BODY MASS INDEX: 44.1 KG/M2 | WEIGHT: 315 LBS | HEIGHT: 71 IN

## 2023-08-16 DIAGNOSIS — E66.813 CLASS 3 SEVERE OBESITY DUE TO EXCESS CALORIES WITH BODY MASS INDEX (BMI) OF 60.0 TO 69.9 IN ADULT, UNSPECIFIED WHETHER SERIOUS COMORBIDITY PRESENT (H): Primary | ICD-10-CM

## 2023-08-16 DIAGNOSIS — E66.01 CLASS 3 SEVERE OBESITY DUE TO EXCESS CALORIES WITH BODY MASS INDEX (BMI) OF 60.0 TO 69.9 IN ADULT, UNSPECIFIED WHETHER SERIOUS COMORBIDITY PRESENT (H): Primary | ICD-10-CM

## 2023-08-16 PROCEDURE — 99214 OFFICE O/P EST MOD 30 MIN: CPT | Mod: VID | Performed by: EMERGENCY MEDICINE

## 2023-08-16 RX ORDER — PHENTERMINE HYDROCHLORIDE 37.5 MG/1
TABLET ORAL
Qty: 30 TABLET | Refills: 2 | Status: SHIPPED | OUTPATIENT
Start: 2023-08-16 | End: 2023-11-14

## 2023-08-16 NOTE — LETTER
8/16/2023         RE: Andres Dominguez  [address] [facility] [address]        Dear Colleague,    Thank you for referring your patient, Andres Dominguez, to the [facility] SURGERY [facility]IC AND BARIATRICS CARE [place]. Please see a copy of my visit note below.    Bariatric Clinic Follow-Up Visit:    Andres Dominguez is a 28 year old  male with Body mass index is 47.18 kg/m .  presenting here today for follow-up on non-surgical efforts for weight loss. Original Intake visit occurred on 12/29/22 with a weight of about 473 lbs and BMI of 66.  Along with diet and behavior changes, after our initial visit we tried to get Wegovy approved for him but his insurance required at least a 3 month trial of phentermine therapy before considering approval for GLP1 RA therapy.  He did not  or return calls about starting up medication therapy and he was lost to follow up until re-presenting on 8/16/23.  See his intake visit notes for details on identified contributors to weight gain in the past.     He did not follow up with sleep medicine after his initial visit referral (at high risk for both MYESHA and weight related hypoventilation).     He did not follow up with dietician.   Weight:   Wt Readings from Last 5 Encounters:   08/16/23 (!) 154.2 kg (340 lb)   11/16/22 (!) 214.6 kg (473 lb)   11/09/22 (!) 215.1 kg (474 lb 4.8 oz)   10/11/22 (!) 214.3 kg (472 lb 6.4 oz)   10/06/16 (!) 158.3 kg (349 lb)    pounds      Comorbidities:  Patient Active Problem List   Diagnosis     Obesity     Tendonitis, Achilles, left     Morbid obesity due to excess calories (H)     BMI 50.0-59.9, adult (H)     CARDIOVASCULAR SCREENING; LDL GOAL LESS THAN 160       Current Outpatient Medications:      phentermine (ADIPEX-P) 37.5 MG tablet, Start half tablet daily after breakfast., Disp: 30 tablet, Rfl: 2     NO ACTIVE MEDICATIONS, , Disp: , Rfl:       Interim: Since our last visit, he has been lost to follow up. Has cut down on junk food  this past year. Stopped Monster energy drinks, having more water now.  No scale at home.  Working on cars for activity/fitness. Putting in a sway bar in a Buick now. Next project to put in radiator in old [place]. 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    Reviewed basics of protein goals, mindful meals and trial of phentermine with how to take and possible side effects reviewed. If tolerating well, we'll stay on medication. If intolerant or low efficacy after 3 months, will consider GLP1 RA therapy/    Plan:   1.  Diet: Welcome back, great work on getting off soda pop/sugary snacks on a daily basis. We'd like to focus on getting protein rich meals every 5-6 hours.  Aim for 30 grams of protein at the beginning of your meal, bulk up the meal with vegetables and finish with some higher fiber carbohydrates to allow for about a 600-650kcal meal and 28-33 grams of lean protein.  Hydrate well between meals with water, aiming for 100 oz/day.     Consider a food journal to track meal timing/protein content to see how this affects your appetite control. Don't skip meals while on phentermine but if lower appetite around lunch, consider a protein supplement like Ensure Max Protein or Premiere Protein.  2. Exercise: continue working on cars/staying active in the garage.   3. Medication: Trial of Phentermine per your insurance policy requirements:  start HALF a tablet after breakfast.  Don't skip lunch.  Stay on this dose until our follow up unless side effects are a problem.  Stop phentermine if mood swings, insomnia, anxiety increase, racing heart beats/increased headaches/chest pain or excessively dry mouth. Don't take phentermine on sick days or if using cold medications/decongestants like sudafed/Dayquil/etc. Don't mix phentermine with other stimulants.  4. Set up Nurse visit at any [facility] clinic close to you, or come to [facility] at our Bariatric Clinic for an official weight as there is a  wide difference in reported weights over the last year.   5. Goals: regular protein intake through the day, every 5-6 hours to stay just ahead of your hunger. Aiming for 90-100grams per day of protein.  6. Follow up with Dietician.  7. I recommend you follow through with the referral from last year with the Sleep clinic to make sure no sleep apnea or weight related sleep/breathing disturbances are present. You can call central scheduling to get your visit arranged: [phone].      We discussed [facility] Bariatric Basics including:  -eating 3 meals daily  -reviewed metabolic needs for weight loss based on Resting Metabolic Rate  -protein goals supportive of healthy weight loss  -avoiding/limiting calorie containing beverages  -We discussed the importance of restorative sleep and stress management in maintaining a healthy weight.  -We discussed the National Weight Control Registry healthy weight maintenance strategies and ways to optimize metabolism.  -We discussed the importance of physical activity including cardiovascular and strength training in maintaining a healthier weight and explored viable options.      Most recent labs:  Lab Results   Component Value Date    HGB 15.4 11/16/2022     Lab Results   Component Value Date    CHOL 201 (H) 06/23/2005     No results found for: HDL  No components found for: LDLCALC  No results found for: TRIG  No results found for: CHOLHDL  No results found for: ALT, AST, GGT, ALKPHOS, BILITOT  No results found for: HGBA1C  No results found for: B12  No components found for: VITDT1  No results found for: MARIELY  No results found for: PTHI  No results found for: ZN  No results found for: VIB1WB  No results found for: TSH  No results found for: TEST    DIETARY HISTORY  Less sweet treats than last year, off Monster now.      PHYSICAL ACTIVITY PATTERNS:  Cardiovascular: garage work  Strength Training: same    REVIEW OF SYSTEMS  No recent illness, feels well. .  PHYSICAL EXAM:  Vitals:  "Ht 1.808 m (5' 11.18\")   Wt (!) 154.2 kg (340 lb)   BMI 47.18 kg/m    Weight:   Wt Readings from Last 3 Encounters:   08/16/23 (!) 154.2 kg (340 lb)   11/16/22 (!) 214.6 kg (473 lb)   11/09/22 (!) 215.1 kg (474 lb 4.8 oz)         GEN: Pleasant, well groomed, in no acute distress  HEENT:  thick neck on video .  NECK: No swelling.  HEART: .  LUNGS: No respiratory difficulty noted. No cough. .  ABDOMEN: .  EXTREMITIES: No tremor. Ambulation is independent on video ..  NEURO: Alert and Oriented X3, baseline slow speech. Good content of thought and recall of projects/plans for the future..  SKIN: No visible rashes. .    Interim study results: no..      30 minutes spent by me on the date of the encounter doing chart review, history and exam, documentation and further activities per the note   Ji Cox MD  [facility]  7:44 AM  8/16/2023    Andres Dominguez is 28 year old  male who presents for a billable video visit today.    How would you like to obtain your AVS? MyChart  If dropped from the video visit, the video invitation should be resent by: Text to cell phone: [phone]  Will anyone else be joining your video visit? No      Video Start Time:  8:00 am    Are there any specific questions or needs that you would like addressed at your visit today?     Weight management. Pt would like to discuss weight loss medication.        Video-Visit Details    Type of service:  Video Visit    Platform used for Video Visit: Wanna Migrate    Video End Time (time video stopped): 8:27 AM    Originating Location (pt. Location): Home      Distant Location (provider location):  On-site    Distant Location (provider location):  [facility] SURGERY [facility] AND BARIATRICS CARE [place]       Again, thank you for allowing me to participate in the care of your patient.        Sincerely,        Ji Cox MD  "

## 2023-08-16 NOTE — PATIENT INSTRUCTIONS
Plan:   1.  Diet: Welcome back, great work on getting off soda pop/sugary snacks on a daily basis. We'd like to focus on getting protein rich meals every 5-6 hours.  Aim for 30 grams of protein at the beginning of your meal, bulk up the meal with vegetables and finish with some higher fiber carbohydrates to allow for about a 600-650kcal meal and 28-33 grams of lean protein.  Hydrate well between meals with water, aiming for 100 oz/day.     Consider a food journal to track meal timing/protein content to see how this affects your appetite control. Don't skip meals while on phentermine but if lower appetite around lunch, consider a protein supplement like Ensure Max Protein or Premiere Protein.  2. Exercise: continue working on cars/staying active in the garage.   3. Medication: Trial of Phentermine per your insurance policy requirements:  start HALF a tablet after breakfast.  Don't skip lunch.  Stay on this dose until our follow up unless side effects are a problem.  Stop phentermine if mood swings, insomnia, anxiety increase, racing heart beats/increased headaches/chest pain or excessively dry mouth. Don't take phentermine on sick days or if using cold medications/decongestants like sudafed/Dayquil/etc. Don't mix phentermine with other stimulants.  4. Set up Nurse visit at any [facility] clinic close to you, or come to [facility] at our Bariatric Clinic for an official weight as there is a wide difference in reported weights over the last year.   5. Goals: regular protein intake through the day, every 5-6 hours to stay just ahead of your hunger. Aiming for 90-100grams per day of protein.  6. Follow up with Dietician.  7. I recommend you follow through with the referral from last year with the Sleep clinic to make sure no sleep apnea or weight related sleep/breathing disturbances are present. You can call central scheduling to get your visit arranged: [phone].            LEAN PROTEIN  SOURCES  Getting 20-30 grams of protein, 3 meals daily, is appropriate for most people, some need more but more than about 40 grams per meal is not useful.  General rule is drinking one ounce of water per gram of protein eaten over the course of the day:  70 grams of protein each day, drink 70 oz of water.  Protein Source Portion Calories Grams of Protein                           Nonfat, plain Greek yogurt    (10 grams sugar or less) 3/4 cup (6 oz)  12-17   Light Yogurt (10 grams sugar or less) 3/4 cup (6 oz)  6-8   Protein Shake 1 shake 110-180 15-30   Skim/1% Milk or lactose-free milk 1 cup ( 8 oz)  8   Plain or light, flavored soymilk 1 cup  7-8   Plain or light, hemp milk 1 cup 110 6   Fat Free or 1% Cottage Cheese 1/2 cup 90 15   Part skim ricotta cheese 1/2 cup 100 14   Part skim or reduced fat cheese slices 1 ounce 65-80 8     Mozzarella String Cheese 1 80 8   Canned tuna, chicken, crab or salmon  (canned in water)  1/2 cup 100 15-20   White fish (broiled, grilled, baked) 3 ounces 100 21   [place]/Tuna (broiled, grilled, baked) 3 ounces 150-180 21   Shrimp, Scallops, Lobster, Crab 3 ounces 100 21   Pork loin, Pork Tenderloin 3 ounces 150 21   Boneless, skinless chicken /turkey breast                          (broiled, grilled, baked) 3 ounces 120 21   [place], [place], [place], and Venison 3 ounces 120 21   Lean cuts of red meat and pork (sirloin,   round, tenderloin, flank, ground 93%-96%) 3 ounces 170 21   Lean or Extra Lean Ground Turkey 1/2 cup 150 20   90-95% Lean [place] Burger 1 nelson 140-180 21   Low-fat casserole with lean meat 3/4 cup 200 17   Luncheon Meats                                                        (turkey, lean ham, roast beef, chicken) 3 ounces 100 21   Egg (boiled, poached, scrambled) 1 Egg 60 7   Egg Substitute 1/2 cup 70 10   Nuts (limit to 1 serving per day)  3 Tbsp. 150 7   Nut [place] (peanut, almond)  Limit to 1 serving or less daily 1 Tbsp. 90 4   Soy Dann  (varies) 1  15   Garbanzo, Black, [place] Beans 1/2 cup 110 7   Refried Beans 1/2 cup 100 7   Kidney and Lima beans 1/2 cup 110 7   Tempeh 3 oz 175 18   Vegan crumbles 1/2 cup 100 14   Tofu 1/2 cup 110 14   Chili (beans and extra lean beef or turkey) 1 cup 200 23   Lentil Stew/Soup 1 cup 150 12   Black Bean Soup 1 cup 175 12           Protein Supplements    Look for (per serving):  15-30 grams protein  Less than 10 grams total carbohydrate    Product Type Serving Calories Protein Grams Sugar Grams Where Available   Premier Protein Shake 1 can 160 30 1 [employer]   AdvantEdge Carb (EAS) Shake 1 can 110 17 0 [facility], Target, Grocery/Pharmacy   Pure Protein Shake 1 can 110 20 1 Target,  Joes   Slim Fast   High Protein Shake 1 can 190 20 1 [employer], Target, Grocery/Pharmacy   Atkins Advantage Shake 1 can 160 15-17 1 [employer], Target, Grocery/Pharmacy   Isopure Zero Carb Juice   bottle 80 20 0 [employer], vitamin shoppe, online   Muscle Milk Light Shake 1 can 110 15 0 [employer], Target, [facility], Grocery/Pharmacy    Whey Shake 1 bottle 100 18 2 www.designerwhey.com   Unjury Protein Powder 1 scoop 90 20 0 www.unjury.TableGrabber  6-845-488-2670   Abilio Nutrition (gluten-free) Powder 1 scoop 180 27 2 www.chikenutrition.com   [place] Protein Powder  (fruit-flavors) 1 scoop or packet 90 23 0 www.dietdirect.JeNaCell  www.bariatriceating.com  GNC, Vitamin Shoppe    Whey Powder 1 scoop 100 18 2 Target, GNC, Walgreens, wwwNovitas   Myoplex Lite Powder 1 scoop 180 25 1 www.eas.com  GNC, [employer]  [employer]   Marco Gurmeet Whey Protein Powder Powder 1 scoop 110 25 0 GNC, Vitamin Shoppe, etc     Protein powders can be used and many brands exist that can provide the 20-30 grams of protein per serving: GNC, Joe's Whey, BiPro USA, BulletProof Collagen and many more are examples in many stores. Cost can be a concern.      On-the-Go Breakfast Ideas  As of 2015, the latest research shows what a huge  impact eating breakfast has on losing weight and feeling your best. People lose more weight when they make breakfast their biggest meal of the day compared to Dinner, but even if you cannot go to that degree, getting a breakfast that has at least 20 grams of protein and even a moderate amount of fat is ideal for maintaining good energy through the day and limits overeating in the evening hours.  The following are some quick and easy suggestions for at least getting something of substance into your body in the morning.  Enjoy!    Eating breakfast within 90 minutes of waking up is an important part of taking care of your body on a restricted calorie diet plan.  After sleeping for hours, your body is in need of fuel.  An ideal breakfast is a combination of protein, whole-grain carbohydrates, or fruit.  Here s why:    -Protein digests very slowly in the body, helping you feel more satisfied.  -Whole grains provide dietary fiber, which also digests slowly and helps keep your gut clean.  -Fruit is a great source of vitamins, minerals, and fiber.     Each one of these breakfast combinations has between 200-300 calories and 15-20 grams of protein.  Feel free to mix and match!    Bone Broth (chicken bone broth or beef bone broth) is a great way to boost protein content. 8oz of bone broth will typically have 9-12grams of protein for 40kcal of energy.    Protein: Choose  -1/2 cup low-fat cottage cheese  -2 hard boiled eggs , or one cooked in olive oil (low/slow heat).  -1 low fat string cheese stick  -1 Tablespoon natural peanut butter  -LP33.TV vegetarian sausage nelson (found in freezer section)  -1 slice lowfat cheese  -6 oz 2% or lowfat Greek yogurt, such as Fage or Oikos.    PLUS    Whole Grains:  Choose   -1 whole wheat English muffin  -1 whole wheat becka, half  -1/2 Fiber One frozen muffin, thawed  -1/2 Fiber One toaster pastry  -1 whole wheat bagel thin  -1/2 cup Kashi cereal  -1 Kashi waffle (or other whole  grain high-fiber waffle)  Aim for whole grain/sprouted breads with at least 3g of fiber/slice if having bread. [place] is one such brand.    OR    Fruit: Choose  -1/3 cup blueberries  -1/2 banana (or a plantain- similar to a banana, yet smaller)  -1/2 cup cantaloupe cubes  -1 small apple  -1 small orange  -1/2 cup strawberries  -handful raspberries/blackberries (each berry is about 1 calorie).    *Adapted from Diabetes Living, Fall 20    Ten Breakfasts Under 250 calories    Ideally, getting between 350-600 calories  (depending on starting height and weight)for breakfast is ideal for avoiding hunger later in the day, adjust/add to the following accordingly:    One- 250 calories, 8.5 g protein  1 slice whole-grain toast   1 Tbsp peanut butter    banana    Two- 250 calories, 8 g protein    cup nonfat/lowfat yogurt  1/3rd cup diced no-sugar peaches  1/3rd cup cereal (like Special K, Cheerios, or bran flakes)    Three- 250 calories, 25 g protein  1 egg scrambled with 1 oz skim milk    cup shredded cheddar    whole grain English muffin  1 oz [place] chavez  1 tsp margarine spread    Four- 225 calories, 25 g protein  1/2 cup Kashi Go-Lean cereal    cup skim milk mixed with 1 scoop Bariatric Advantage protein powder    cup no-sugar diced pears    Five- 250 calories, 20 g protein    cup oatmeal prepared with skim milk, 1 scoop protein powder, and sugar-free maple syrup    Six- 200 calories, 5 g protein  1 whole grain waffle, toasted  1 tablespoon creamy peanut or almond butter    Seven-  250 calories, 19 g protein  Breakfast sandwich: 1 slice whole grain toast, cut in half.  Add 1 scrambled egg and one slice cheddar  cheese.    Eight-  250 calories, 15 g protein  2 eggs scrambled with 1/3 cup frozen spinach (heat before adding to eggs) and 2 tablespoons low fat cream cheese.    Nine-  150 calories, 15 g protein  2/3rd cup cottage cheese    cup cantaloupe    Ten- 200 calories, 20 g protein  Fruit smoothie made with 4 oz.  nonfat Greek yogurt,   cup berries, 1 scoop protein powder, and 4 oz skim milk.    Ten Lunches Under 250 Calories    Aim for lunch to be around 300-400 calories a day when trying to lose weight and get that protein in!    One- 200 calories, 11 g protein  1/3 cup tuna salad made with light sadler on 1 slice whole grain bread  1 small peeled apple    Two- 250 calories, 16 g protein  1/3 cup lowfat cottage cheese    cup cooked green beans    small fruit cocktail (in natural juice)    Three- 200 calories, 11 g protein    grilled cheese sandwich on whole grain bread with lowfat cheese  2/3rd cup of tomato soup    Four- 250 calories, 22 g protein  Deli wrap: 1 oz sliced turkey, 1 oz sliced ham, 1 oz sliced chicken rolled up with 1 slice low-fat cheese  1 small orange    Five- 250 calories, 28 g protein  2/3rd cup chili with 1 oz shredded cheese  4 saltine crackers    Six- 250 calories, 22 g protein  1 cup fresh spinach with 2 oz chicken, 1/3rd cup mandarin oranges, and 2 tablespoons sliced almonds with 1 tablespoon  vinaigrette dressing    Seven- 200 calories, 11 g protein  1 Tbsp sugar-free preserves and 1 Tbsp peanut butter on 1 slice whole grain toast    cup nonfat/lowfat Greek yogurt    Eight- 250 calories, 18 g protein  1 small soft-shell chicken taco with 1 oz shredded cheese, lettuce, tomato, salsa, and 1 Tbsp light sour cream    cup black beans    Nine- 225 calories, 13 g protein  2 ounces baked chicken  1/4 cup mashed potatoes    cup green beans    Ten- 200 calories, 21 g protein  Deli becka: 2 oz roast beef or other deli meat with 1 tsp Ernesto mayonnaise and sliced tomato, onion, and lettuce  1/3rd cup cottage cheese      Ten Dinners Under 300 calories    If you're eating a large breakfast and medium lunch, keep dinner small.  300-400 calories is ideal for most people depending on their caloric needs.    One- 300 calories, 12 g protein  1-inch thick slice of turkey meatloaf    cup baked butternut squash    Two- 200  calories, 9 g protein  Bread-less BLT: 3 slices turkey chavez, sliced tomato, wrapped in a large lettuce leaf    cup peeled fruit    Three- 275 calories, 36 g protein  3 oz roasted chicken    cup cooked broccoli    cup shredded cheddar cheese    cup unsweetened applesauce    Four- 200 calories, 25 g protein  3 oz baked tilapia  1/3rd cup cooked carrots    cup yogurt    Five- 250 calories, 20 g protein  Grilled ham  n  Swiss: spread 2 tsp ghee or butter on 1 slice of whole grain bread.  Cut bread in half, layer 2 oz deli ham with 1 piece of Swiss cheese and grill until cheese is melted.    cup cooked vegetables    Six- 250 calories, 18 g protein  Vegetarian cheeseburger: 1 [place] cheeseburger topped with lettuce, onion, tomato, and ketchup/mustard    cup sweet potato fries    Seven- 250 calories, 18 g protein  Pork pot roast: 2 oz roasted pork loin, 1/3rd cup roasted carrots,   medium potato, cooked with   cup gravy    Eight- 330 calories, 25 g protein  2 oz meatballs (about 2 small meatballs)    cup spaghetti sauce  1/2 piece toast topped with 1 tsp ghee or butterand topped with garlic powder, toasted in oven    Nine- 250 calories, 16 g protein  Mexican pizza: one 8  corn tortilla topped with 2 oz chicken,   cup salsa, 2 tablespoons black beans, 2 tablespoons shredded cheese.  Bake until cheese is melted.    Ten- 250 calories, 22 g protein  Shrimp stir-hare: 3 oz cooked shrimp, 1/6th onion,   pepper,   cup chopped carrots sautéed in 1 tablespoon olive oil, topped with 2 tablespoons stir hare sauce and a pinch of sesame seeds        150 Calories or Less Snack Ideas   1 hardboiled egg with   cup berries  1 small apple with 1 hardboiled egg  10 almonds with   cup berries  2 clementines with 1 light string cheese  1 light string cheese with   sliced apple  1 light string cheese wrapped in 2 slices of turkey  4 100% whole wheat crackers (e.g. Triscuit) with 1 light string cheese    c. cottage cheese with   cup fruit and 1  Tbsp sunflower seeds     cup cottage cheese with   of an avocado     can tuna fish with 1 cup sliced cucumbers     cup roasted garbanzo beans with paprika and cayenne pepper    baked sweet potato with   cup chili beans or   cup cottage cheese  2 oz. nitrate free turkey slices with 1 cup carrots  1 container (6 oz) of low sugar (less than 10 grams of sugar) greek yogurt   3 Tablespoons of hummus with 1 cup sliced bell peppers   2 Tablespoons of hummus with 15 baby carrots  4 Tablespoons ranch dip made with plain Greek Yogurt and 3 mini cucumbers  1/4 cup nuts (any kind)  1 Tablespoon peanut butter with 1 stalk celery   1 dill pickle wrapped in 1-2 slices of deli ham with 1 tsp of mayonnaise/mustard.

## 2023-08-16 NOTE — PROGRESS NOTES
Bariatric Clinic Follow-Up Visit:    Andres Dominguez is a 28 year old  male with Body mass index is 47.18 kg/m .  presenting here today for follow-up on non-surgical efforts for weight loss. Original Intake visit occurred on 12/29/22 with a weight of about 473 lbs and BMI of 66.  Along with diet and behavior changes, after our initial visit we tried to get Wegovy approved for him but his insurance required at least a 3 month trial of phentermine therapy before considering approval for GLP1 RA therapy.  He did not  or return calls about starting up medication therapy and he was lost to follow up until re-presenting on 8/16/23.  See his intake visit notes for details on identified contributors to weight gain in the past.     He did not follow up with sleep medicine after his initial visit referral (at high risk for both MYESHA and weight related hypoventilation).     He did not follow up with dietician.   Weight:   Wt Readings from Last 5 Encounters:   08/16/23 (!) 154.2 kg (340 lb)   11/16/22 (!) 214.6 kg (473 lb)   11/09/22 (!) 215.1 kg (474 lb 4.8 oz)   10/11/22 (!) 214.3 kg (472 lb 6.4 oz)   10/06/16 (!) 158.3 kg (349 lb)    pounds      Comorbidities:  Patient Active Problem List   Diagnosis    Obesity    Tendonitis, Achilles, left    Morbid obesity due to excess calories (H)    BMI 50.0-59.9, adult (H)    CARDIOVASCULAR SCREENING; LDL GOAL LESS THAN 160       Current Outpatient Medications:     phentermine (ADIPEX-P) 37.5 MG tablet, Start half tablet daily after breakfast., Disp: 30 tablet, Rfl: 2    NO ACTIVE MEDICATIONS, , Disp: , Rfl:       Interim: Since our last visit, he has been lost to follow up. Has cut down on junk food this past year. Stopped Monster energy drinks, having more water now.  No scale at home.  Working on cars for activity/fitness. Putting in a sway bar in a Buick now. Next project to put in radiator in old [place]. 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    Reviewed basics of  protein goals, mindful meals and trial of phentermine with how to take and possible side effects reviewed. If tolerating well, we'll stay on medication. If intolerant or low efficacy after 3 months, will consider GLP1 RA therapy/    Plan:   1.  Diet: Welcome back, great work on getting off soda pop/sugary snacks on a daily basis. We'd like to focus on getting protein rich meals every 5-6 hours.  Aim for 30 grams of protein at the beginning of your meal, bulk up the meal with vegetables and finish with some higher fiber carbohydrates to allow for about a 600-650kcal meal and 28-33 grams of lean protein.  Hydrate well between meals with water, aiming for 100 oz/day.     Consider a food journal to track meal timing/protein content to see how this affects your appetite control. Don't skip meals while on phentermine but if lower appetite around lunch, consider a protein supplement like Ensure Max Protein or Premiere Protein.  2. Exercise: continue working on cars/staying active in the garage.   3. Medication: Trial of Phentermine per your insurance policy requirements:  start HALF a tablet after breakfast.  Don't skip lunch.  Stay on this dose until our follow up unless side effects are a problem.  Stop phentermine if mood swings, insomnia, anxiety increase, racing heart beats/increased headaches/chest pain or excessively dry mouth. Don't take phentermine on sick days or if using cold medications/decongestants like sudafed/Dayquil/etc. Don't mix phentermine with other stimulants.  4. Set up Nurse visit at any [facility] clinic close to you, or come to [facility] at our Bariatric Clinic for an official weight as there is a wide difference in reported weights over the last year.   5. Goals: regular protein intake through the day, every 5-6 hours to stay just ahead of your hunger. Aiming for 90-100grams per day of protein.  6. Follow up with Dietician.  7. I recommend you follow through with the  "referral from last year with the Sleep clinic to make sure no sleep apnea or weight related sleep/breathing disturbances are present. You can call central scheduling to get your visit arranged: [phone].      We discussed [facility] Bariatric Basics including:  -eating 3 meals daily  -reviewed metabolic needs for weight loss based on Resting Metabolic Rate  -protein goals supportive of healthy weight loss  -avoiding/limiting calorie containing beverages  -We discussed the importance of restorative sleep and stress management in maintaining a healthy weight.  -We discussed the National Weight Control Registry healthy weight maintenance strategies and ways to optimize metabolism.  -We discussed the importance of physical activity including cardiovascular and strength training in maintaining a healthier weight and explored viable options.      Most recent labs:  Lab Results   Component Value Date    HGB 15.4 11/16/2022     Lab Results   Component Value Date    CHOL 201 (H) 06/23/2005     No results found for: HDL  No components found for: LDLCALC  No results found for: TRIG  No results found for: CHOLHDL  No results found for: ALT, AST, GGT, ALKPHOS, BILITOT  No results found for: HGBA1C  No results found for: B12  No components found for: VITDT1  No results found for: MARIELY  No results found for: PTHI  No results found for: ZN  No results found for: VIB1WB  No results found for: TSH  No results found for: TEST    DIETARY HISTORY  Less sweet treats than last year, off Monster now.      PHYSICAL ACTIVITY PATTERNS:  Cardiovascular: garage work  Strength Training: same    REVIEW OF SYSTEMS  No recent illness, feels well. .  PHYSICAL EXAM:  Vitals: Ht 1.808 m (5' 11.18\")   Wt (!) 154.2 kg (340 lb)   BMI 47.18 kg/m    Weight:   Wt Readings from Last 3 Encounters:   08/16/23 (!) 154.2 kg (340 lb)   11/16/22 (!) 214.6 kg (473 lb)   11/09/22 (!) 215.1 kg (474 lb 4.8 oz)         GEN: Pleasant, well groomed, in no acute " distress  HEENT:  thick neck on video .  NECK: No swelling.  HEART: .  LUNGS: No respiratory difficulty noted. No cough. .  ABDOMEN: .  EXTREMITIES: No tremor. Ambulation is independent on video ..  NEURO: Alert and Oriented X3, baseline slow speech. Good content of thought and recall of projects/plans for the future..  SKIN: No visible rashes. .    Interim study results: no..      30 minutes spent by me on the date of the encounter doing chart review, history and exam, documentation and further activities per the note   Ji Cox MD  [facility]  7:44 AM  8/16/2023    Andres Dominguez is 28 year old  male who presents for a billable video visit today.    How would you like to obtain your AVS? MyChart  If dropped from the video visit, the video invitation should be resent by: Text to cell phone: [phone]  Will anyone else be joining your video visit? No      Video Start Time:  8:00 am    Are there any specific questions or needs that you would like addressed at your visit today?     Weight management. Pt would like to discuss weight loss medication.        Video-Visit Details    Type of service:  Video Visit    Platform used for Video Visit: [employer])    Video End Time (time video stopped): 8:27 AM    Originating Location (pt. Location): Home      Distant Location (provider location):  On-site    Distant Location (provider location):  [facility] [place]

## 2023-09-27 ENCOUNTER — VIRTUAL VISIT (OUTPATIENT)
Dept: SURGERY | Facility: CLINIC | Age: 28
End: 2023-09-27
Payer: COMMERCIAL

## 2023-09-27 DIAGNOSIS — Z76.89 ENCOUNTER FOR WEIGHT MANAGEMENT: ICD-10-CM

## 2023-09-27 DIAGNOSIS — E66.813 CLASS 3 SEVERE OBESITY DUE TO EXCESS CALORIES WITH BODY MASS INDEX (BMI) OF 60.0 TO 69.9 IN ADULT, UNSPECIFIED WHETHER SERIOUS COMORBIDITY PRESENT (H): Primary | ICD-10-CM

## 2023-09-27 DIAGNOSIS — E66.01 CLASS 3 SEVERE OBESITY DUE TO EXCESS CALORIES WITH BODY MASS INDEX (BMI) OF 60.0 TO 69.9 IN ADULT, UNSPECIFIED WHETHER SERIOUS COMORBIDITY PRESENT (H): Primary | ICD-10-CM

## 2023-09-27 DIAGNOSIS — G47.19 EXCESSIVE DAYTIME SLEEPINESS: ICD-10-CM

## 2023-09-27 PROCEDURE — 99214 OFFICE O/P EST MOD 30 MIN: CPT | Mod: VID | Performed by: EMERGENCY MEDICINE

## 2023-09-27 NOTE — PATIENT INSTRUCTIONS
Plan:   1.  Diet: Track your intake with an christian like My Net Diary or Lose it.  These allow you to see what you're getting if you take just a little time to measure.  Aiming for 2250-2400kcal/day right now until we have an accurate weight on you should allow for good weekly weight reduction unless measurements are way off.   2. 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   3. Medication: Phentermine half tablet (18.75mg) can be increased to either one full tablet daily (37.5mg) or half tablet twice daily if it doesn't interfere with your early bed time. Keeping that second half tablet dose about 8-9 hours before your planned bedtime should allow for enough time to metabolize it. Let me know if this isn't tolerated well.  4. Call Sleep Medicine clinic at [phone] to reschedule evaluation for high probability of sleep apnea/hypoxemia.  5. Set up weight/blood pressure check with our clinic or any [facility] clinic. Our clinic can schedule a nurse visit if you call [phone].  We'll want a weight check at least every 6 weeks to make sure therapy is either helpful or not. Your last weight in our records may not be accurate so we need the baseline to guide recommendations going forward.  6. Set up Dietician visit at [phone].   7. Get labs done either as a scheduled visit or as a walk up lab at [facility] or [facility] if more convenient. I suspect your fatigue is based on your fractured sleep schedule and possibly untreated sleep apnea but we'll want to check labs for issues that affect your energy as well.           LEAN PROTEIN SOURCES  Getting 20-30 grams of protein, 3 meals daily, is appropriate for most people, some need more but more than about 40 grams per meal is not useful.  General rule is drinking one ounce of water per gram  of protein eaten over the course of the day:  70 grams of protein each day, drink 70 oz of water.  Protein Source Portion Calories Grams of Protein                           Nonfat, plain Greek yogurt    (10 grams sugar or less) 3/4 cup (6 oz)  12-17   Light Yogurt (10 grams sugar or less) 3/4 cup (6 oz)  6-8   Protein Shake 1 shake 110-180 15-30   Skim/1% Milk or lactose-free milk 1 cup ( 8 oz)  8   Plain or light, flavored soymilk 1 cup  7-8   Plain or light, hemp milk 1 cup 110 6   Fat Free or 1% Cottage Cheese 1/2 cup 90 15   Part skim ricotta cheese 1/2 cup 100 14   Part skim or reduced fat cheese slices 1 ounce 65-80 8     Mozzarella String Cheese 1 80 8   Canned tuna, chicken, crab or salmon  (canned in water)  1/2 cup 100 15-20   White fish (broiled, grilled, baked) 3 ounces 100 21   [place]/Tuna (broiled, grilled, baked) 3 ounces 150-180 21   Shrimp, Scallops, Lobster, Crab 3 ounces 100 21   Pork loin, Pork Tenderloin 3 ounces 150 21   Boneless, skinless chicken /turkey breast                          (broiled, grilled, baked) 3 ounces 120 21   [place], [place], [employer], and Venison 3 ounces 120 21   Lean cuts of red meat and pork (sirloin,   round, tenderloin, flank, ground 93%-96%) 3 ounces 170 21   Lean or Extra Lean Ground Turkey 1/2 cup 150 20   90-95% Lean [place] Burger 1 nelson 140-180 21   Low-fat casserole with lean meat 3/4 cup 200 17   Luncheon Meats                                                        (turkey, lean ham, roast beef, chicken) 3 ounces 100 21   Egg (boiled, poached, scrambled) 1 Egg 60 7   Egg Substitute 1/2 cup 70 10   Nuts (limit to 1 serving per day)  3 Tbsp. 150 7   Nut [place] (peanut, almond)  Limit to 1 serving or less daily 1 Tbsp. 90 4   Soy Burger (varies) 1  15   Garbanzo, Black, [place] Beans 1/2 cup 110 7   Refried Beans 1/2 cup 100 7   Kidney and Lima beans 1/2 cup 110 7   Tempeh 3 oz 175 18   Vegan crumbles 1/2 cup 100 14   Tofu 1/2 cup 110 14    Chili (beans and extra lean beef or turkey) 1 cup 200 23   Lentil Stew/Soup 1 cup 150 12   Black Bean Soup 1 cup 175 12       Example Meal Plan for a 1326-9227 Calorie Diet:  Andres, increase portions by 50-60% to hit your intake target of about 2200-2400kcal/day.     In order to fuel your weight loss properly and avoid hunger-induced overeating later in the day, for your height and weight, you will enjoy the most success by following the diet below or similar with adjustments based on your particular tastes and preferences.  Exercise may influence speed, amount of weight loss further.     I recommend getting into a meal routine and keeping it similar day to day in the beginning so you don t have to think too hard about what you re going to make/eat.  Keep snacks healthy, ideally containing protein and some vegetables.  Non-processed food is preferable to packaged items.  Eat at least a few crunchy green vegetables if having a snack, which should be 2-3 hours after your mealtimes(prepare these ahead of time for ease of use).  Drink 64 oz -80 oz of water daily for most, some of you will need more and we'll discuss it at your visit if that is the case.      When changing our diet,  we can often mistake thirst for hunger or just have some distracted eating habits that we need to break free from ('bored/mindless eating', screen time,work, driving,etc).  A glass of water and reconsideration of our hunger is often all that is needed.  Having the urge is not the problem, but watching it pass by without acting on it is the goal.    If you re having hunger problems, add a protein drink/snack to your morning hours or afternoon snack with at least 20grams of protein and not too much sugar (under 10g).  A carton of higher protein/low sugar yogurt can work as well.  If the urge to snack is overwhelming and not satiated, try going for a 10 minute walk/exercise, come home and drink a glass of water and if still hungry, have a   calorie snack (handful of raw/sprouted nuts, veggies and string cheese, protein bar, etc).  Savor it.    It is better to have a large breakfast, a moderate lunch and a smaller dinner to fuel your day.  People lose 10-15% more weight during their weight loss season with this strategy. Optimizing your protein intake at each meal will further keep you more satisfied while eating less food overall.  Getting exercise in early has also been shown to offer the best results (before breakfast ideally but anytime is the right time to exercise if that is not an option for you).    To make sure you re getting adequate vitamins and minerals during weight loss, I recommend one complete multivitamin a day of your choice.  Consider a probiotic and taking some vitamin D 2000 IU daily.    Let supper be your last meal of the day and ideally try to have at least 12 hours between supper and breakfast the next day to tap into some beneficial overnight fasting dynamics.  Midnight snacks need to go away. Water in the evening is fine, unsweetened, non caffeinated herbal tea is helpful as well.  Consolidating your meals within a 8-12 hour period of your day will help tap into these additional metabolic benefits and tends to keep your appetite up for breakfast, further helping to stay on track.  For most of my patients, I don't recommend an intermittent fasting style diet (many find it hard to fit in their lifestyle) but an overnight fast is very doable for most patients and helps regulate our hunger drives a little better.  This makes it very important to nail good intake at all three meals to feel satisfied/energized and still lose weight.      If evening snacking desires are high, consider a glass of fiber supplement for some additional fullness (metamucil or similar). Most of us don't get the 25-30 grams per day of fiber that promotes good gut health/satiety.  Benefiber, metamucil, citrucel are reasonable/affordable options for  most people.  Inulin, chicory, psyllium husk are reasonable options but start slow and low in the dose to avoid gas/bloating until your gut gets acclimated (ramping up to 5-10 grams per day of supplemental fiber after 3-4 weeks if needed).      Example Meal Plan:  Breakfast: 450-475 Calories  1 egg cooked on low in olive oil:   calories.  5oz Greek Yogurt (Fage plain classic: ~150 dustin)  Handful of Berries of your choice (about a calorie per berry or 20-40cal per handful)    cup(cooked) of  old fashioned oatmeal or 1/2 cup(cooked) steel cut oats. (150 dustin)  Sprinkle amount of brown sugar and a pat of butter. (40 dustin)  Glass of  Water  Black coffee or unsweetened Tea (0calories).      2-3 hours Later Snack: (195 calories).  Glass of water  One string Cheese (80 calories) or 4 oz creamed cottage cheese (115 calories) with  Crunchy Celery sticks (less than 10 calories per large stalk) 2 stalks. (20 calories)    of a  Large Banana or   of a Large Apple (60 calories):  eat second half at lunch or afternoon snack.     Lunch:300 -350 calories   Chicken Breast  (baked/broiled/roasted/grilled)  4-6 oz.  (125-180 dustin), BBQ sauce/hot sauce/mustard/seasoning is free. Just use a reasonable amount. Or a can of tuna with 1 tablespoon mayonnaise.  Salad: lettuce, any other veggies (cucumbers, green peppers/celery you like and a small drizzle of dressing to just flavor.  Go as big on the veggies as you like,  as they are practically calorie free.   A whole, 8 inch cucumber is 45 calories, a whole green pepper is 23 calories, a stalk of celery is 9 calories.  [place] Dressing is 60 calories per tablespoon..so moderate your desired dressing or do a drizzle of olive oil and splash of balsamic vinegar on top,  Total calories unlikely to be over 150 even with dressing.  Glass of Water.    Option for lunch is meal replacement protein drink/smoothie.  Need at least 20 grams of protein and eat the rest of your apple/banana from  the morning snack.      Afternoon Snack: 150-200 calories   Cheese Stick or cottage cheese again  and a fresh fruit OR  Granola Bar (protein Bar acceptable if under 200 calories OR  Homemade smoothies:  8oz skim milk,  a handful of berries (fresh or frozen and a serving of protein powder such as BiPro or Joselin sWhey for example.  If you don't like dairy, make with 8oz water, one small banana, handful of berries and the protein powder, add any veggies you want as well:  roughly 200 calories.   Glass of Water    Dinner: 325 calories  4oz of fresh, [place] salmon.  Broiled (salt/pepper/dill) for about 8-8.5 minutes (200calories) or  4oz filet mignon steak or sirloin steak  Salad or vegetable sautéed lightly in olive oil or   Broccoli 1.5  cups chopped and steamed  or micro-waved in a little water (75 calories)  Glass of Water,    Cup of herbal tea (unsweetened, caffeine free)      Herbs and seasonings are encouraged to flavor your foods/vegetables.  Make your food delicious.      Tips for Success:  1.  Prepare proteins ahead of time (broil chicken breasts in bulk so you can grab and go), steel cut oats/lentils can be stored in casserole dish/bowl in the fridge for quick scoop in the morning and rewarm in microwave, make use of crock pot recipes (watch salt content).  Making meals that cover 3-4 future meals is an easy way to stay on track.  2.  Drink a 8-12 oz glass of water every 2-3 hours when awake.  We often mistake hunger for thirst, especially when losing weight.  3. Remember your Reward and Motivation when things get hard.  4.  Weigh yourself every morning and record, you'll stay on track better and learn how our biorhythms, diet and elimination patterns show up on the scale. Don't worry about 1 or 2 day patterns, but when on track you'll notice good trend downward of weight over 3-4 day segments.  Plateaus tend to resolve after 4-8 days in most cases if you stay consistent with your plan.  These are natural and  "part of weight loss, even if you're perfect with your plan execution.  5. Call if problems/concerns.  aitainment is a great tool to stay in touch and provide weekly outside accountability. Check in with questions or if you want to brag.  6.  Find a handful of meals/foods that keep you on track and feeling good and get into a routine that is sustainable for you.  It's OK to have a routine that works for you.  7.  Consider taking a complete multivitamin just to make sure all micronutrients are adequate during weight loss.  8. If losing hair/brittle nails it usually means you are not taking enough protein.  Minimum goal is 60 grams daily of protein for smaller women, 80 grams a day for men. Consider taking Biotin as supplement or a \"Hair and Nail\" multivitamin.    "

## 2023-09-27 NOTE — LETTER
9/27/2023         RE: Andres Dominguez  [address] [facility] [address]        Dear Colleague,    Thank you for referring your patient, Andres Dominguez, to the [facility] [place]. Please see a copy of my visit note below.    Andres Dominguez is 28 year old  male who presents for a billable video visit today.    How would you like to obtain your AVS? MyChart  If dropped from the video visit, the video invitation should be resent by: Text to cell phone: [phone]  Will anyone else be joining your video visit? No      Video Start Time: 11:16 AM    Are there any specific questions or needs that you would like addressed at your visit today? No    Provider Notes: Bariatric Clinic Follow-Up Visit:    Andres Dominguez is a 28 year old  male with There is no height or weight on file to calculate BMI.  presenting here today for follow-up on non-surgical efforts for weight loss.  Original Intake visit occurred on 12/29/22 with a weight of about 473 lbs and BMI of 66.  Along with diet and behavior changes, after our initial visit we tried to get Wegovy approved for him but his insurance required at least a 3 month trial of phentermine therapy before considering approval for GLP1 RA therapy.  He did not  or return calls about starting up medication therapy and he was lost to follow up until re-presenting on 8/16/23.  Phentermine therapy 18.75mg/day was started at that visit and recommended to increase to 37.5mg/day if tolerated at our 9/27/23 visit.  See his intake visit notes for details on identified contributors to weight gain in the past.      He did not follow up with sleep medicine after his initial visit referral (at high risk for both MYESHA and weight related hypoventilation). Phone number and reminder given at our 9/27/23 visit     He did not follow up with dietician. he'll set up new visit after our 9/27/23 visit. Phone number provided    He has not done labs  ordered yet on previous visits, so extended labs and referral to sleep medicine again as of 9/27/23 visit.  Weight:   Wt Readings from Last 5 Encounters:   08/16/23 (!) 154.2 kg (340 lb)   11/16/22 (!) 214.6 kg (473 lb)   11/09/22 (!) 215.1 kg (474 lb 4.8 oz)   10/11/22 (!) 214.3 kg (472 lb 6.4 oz)   10/06/16 (!) 158.3 kg (349 lb)    pounds    Weights likely unreliable as lacks home scale and last visit in August was video visit.  Comorbidities:  Patient Active Problem List   Diagnosis     Obesity     Tendonitis, Achilles, left     Morbid obesity due to excess calories (H)     BMI 50.0-59.9, adult (H)     CARDIOVASCULAR SCREENING; LDL GOAL LESS THAN 160       Current Outpatient Medications:      phentermine (ADIPEX-P) 37.5 MG tablet, Start half tablet daily after breakfast., Disp: 30 tablet, Rfl: 2      Interim: Since our last visit, he has been eating more nuts/seafood, less sugary foods and candy and Monster drinks.   Phentermine has been  half tablet around 9am. Going to bed by 8pm. 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  Once back will fix thing around the house. .   Tracking  Fitness:  Access to scale? No..  Placed order for vital sign check to get accurate measure this Fall and to make sure BP/heart rate tolerating phentermine at half tablet daily, will increase to full tablet daily if tolerated. Plan to continue for at least 6 weeks on this dose to see if worthwhile to continue. We discussed need for follow up with dietician, labs, sleep medicine and getting accurate weight check/vital sign check this next month.   Plan:   1.  Diet: Track your intake with an christian like My Net Diary or Lose it.  These allow you to see what you're getting if you take just a little time to measure.  Aiming for 2250-2400kcal/day right now until we have an accurate weight on you should allow for good weekly weight reduction unless measurements are way off.    2. 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   3. Medication: Phentermine half tablet (18.75mg) can be increased to either one full tablet daily (37.5mg) or half tablet twice daily if it doesn't interfere with your early bed time. Keeping that second half tablet dose about 8-9 hours before your planned bedtime should allow for enough time to metabolize it. Let me know if this isn't tolerated well.  4. Call Sleep Medicine clinic at [phone] to reschedule evaluation for high probability of sleep apnea/hypoxemia.  5. Set up weight/blood pressure check with our clinic or any [facility] clinic. Our clinic can schedule a nurse visit if you call [phone].  We'll want a weight check at least every 6 weeks to make sure therapy is either helpful or not. Your last weight in our records may not be accurate so we need the baseline to guide recommendations going forward.  6. Set up Dietician visit at [phone].   7. Get labs done either as a scheduled visit or as a walk up lab at [facility] or [facility] if more convenient. I suspect your fatigue is based on your fractured sleep schedule and possibly untreated sleep apnea but we'll want to check labs for issues that affect your energy as well.       We discussed [facility] Bariatric Basics including:  -eating 3 meals daily  -reviewed metabolic needs for weight loss based on Resting Metabolic Rate  -protein goals supportive of healthy weight loss  -avoiding/limiting calorie containing beverages  -We discussed the importance of restorative sleep and stress management in maintaining a healthy weight.  -We discussed the National Weight Control Registry healthy weight maintenance strategies and ways to optimize metabolism.  -We discussed the importance of physical activity including  cardiovascular and strength training in maintaining a healthier weight and explored viable options.      Most recent labs:  Lab Results   Component Value Date    HGB 15.4 11/16/2022     Lab Results   Component Value Date    CHOL 201 (H) 06/23/2005     No results found for: HDL  No components found for: LDLCALC  No results found for: TRIG  No results found for: CHOLHDL  No results found for: ALT, AST, GGT, ALKPHOS, BILITOT  No results found for: HGBA1C  No results found for: B12  No components found for: VITDT1  No results found for: MARIELY  No results found for: PTHI  No results found for: ZN  No results found for: VIB1WB  No results found for: TSH  No results found for: TEST    DIETARY HISTORY  Tracking technique: limited, cutting out sugar has been his main goal  Positive Changes Since Last Visit: off Monster and candy  Struggling With: mindfulness, low energy    Getting Adequate Protein: he thinks so but not tracking yet, needs dietician visit  Sleep schedule: see above.      PHYSICAL ACTIVITY PATTERNS:  Cardiovascular: see above  Strength Training: see above, garage work on cars.    REVIEW OF SYSTEMS  Fatigued..  PHYSICAL EXAM:  Vitals: There were no vitals taken for this visit.  Weight:   Wt Readings from Last 3 Encounters:   08/16/23 (!) 154.2 kg (340 lb)   11/16/22 (!) 214.6 kg (473 lb)   11/09/22 (!) 215.1 kg (474 lb 4.8 oz)         GEN: Pleasant, well groomed, in no acute distress  HEENT:  thick neck. 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  .  NECK: No swelling.  HEART: .  LUNGS: No respiratory difficulty noted. No cough. .  ABDOMEN: .  EXTREMITIES: No tremor. ..  NEURO: Alert and Oriented X3, fluent speech. .  SKIN: No visible rashes.   Fatigud affect, no sleep shiners evident. .    Interim study results: hasn't done labs yet..      30 minutes spent by me on the date of the encounter doing chart review, history and exam, documentation and further activities per the note   Ji Cox  MD  [facility]  11:16 AM  9/27/2023      Video-Visit Details    Type of service:  Video Visit    Platform used for Video Visit: Flickme    Video End Time (time video stopped): 11:50 AM    Originating Location (pt. Location): Home      Distant Location (provider location):  On-site    Distant Location (provider location):  [facility] [place]        Again, thank you for allowing me to participate in the care of your patient.        Sincerely,        Ji Cox MD

## 2023-09-27 NOTE — PROGRESS NOTES
Andres Dominguez is 28 year old  male who presents for a billable video visit today.    How would you like to obtain your AVS? MyChart  If dropped from the video visit, the video invitation should be resent by: Text to cell phone: [phone]  Will anyone else be joining your video visit? No      Video Start Time: 11:16 AM    Are there any specific questions or needs that you would like addressed at your visit today? No    Provider Notes: Bariatric Clinic Follow-Up Visit:    Andres Dominguez is a 28 year old  male with There is no height or weight on file to calculate BMI.  presenting here today for follow-up on non-surgical efforts for weight loss.  Original Intake visit occurred on 12/29/22 with a weight of about 473 lbs and BMI of 66.  Along with diet and behavior changes, after our initial visit we tried to get Wegovy approved for him but his insurance required at least a 3 month trial of phentermine therapy before considering approval for GLP1 RA therapy.  He did not  or return calls about starting up medication therapy and he was lost to follow up until re-presenting on 8/16/23.  Phentermine therapy 18.75mg/day was started at that visit and recommended to increase to 37.5mg/day if tolerated at our 9/27/23 visit.  See his intake visit notes for details on identified contributors to weight gain in the past.      He did not follow up with sleep medicine after his initial visit referral (at high risk for both MYESHA and weight related hypoventilation). Phone number and reminder given at our 9/27/23 visit     He did not follow up with dietician. he'll set up new visit after our 9/27/23 visit. Phone number provided    He has not done labs ordered yet on previous visits, so extended labs and referral to sleep medicine again as of 9/27/23 visit.  Weight:   Wt Readings from Last 5 Encounters:   08/16/23 (!) 154.2 kg (340 lb)   11/16/22 (!) 214.6 kg (473 lb)   11/09/22 (!) 215.1 kg (474 lb 4.8 oz)   10/11/22 (!) 214.3 kg  (472 lb 6.4 oz)   10/06/16 (!) 158.3 kg (349 lb)    pounds    Weights likely unreliable as lacks home scale and last visit in August was video visit.  Comorbidities:  Patient Active Problem List   Diagnosis    Obesity    Tendonitis, Achilles, left    Morbid obesity due to excess calories (H)    BMI 50.0-59.9, adult (H)    CARDIOVASCULAR SCREENING; LDL GOAL LESS THAN 160       Current Outpatient Medications:     phentermine (ADIPEX-P) 37.5 MG tablet, Start half tablet daily after breakfast., Disp: 30 tablet, Rfl: 2      Interim: Since our last visit, he has been eating more nuts/seafood, less sugary foods and candy and Monster drinks.   Phentermine has been  half tablet around 9am. Going to bed by 8pm. 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  Once back will fix thing around the house. .   Tracking  Fitness:  Access to scale? No..  Placed order for vital sign check to get accurate measure this Fall and to make sure BP/heart rate tolerating phentermine at half tablet daily, will increase to full tablet daily if tolerated. Plan to continue for at least 6 weeks on this dose to see if worthwhile to continue. We discussed need for follow up with dietician, labs, sleep medicine and getting accurate weight check/vital sign check this next month.   Plan:   1.  Diet: Track your intake with an christian like My Net Diary or Lose it.  These allow you to see what you're getting if you take just a little time to measure.  Aiming for 2250-2400kcal/day right now until we have an accurate weight on you should allow for good weekly weight reduction unless measurements are way off.   2. 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   3. Medication: Phentermine half tablet (18.75mg) can be increased to either one  full tablet daily (37.5mg) or half tablet twice daily if it doesn't interfere with your early bed time. Keeping that second half tablet dose about 8-9 hours before your planned bedtime should allow for enough time to metabolize it. Let me know if this isn't tolerated well.  4. Call Sleep Medicine clinic at [phone] to reschedule evaluation for high probability of sleep apnea/hypoxemia.  5. Set up weight/blood pressure check with our clinic or any [facility] clinic. Our clinic can schedule a nurse visit if you call [phone].  We'll want a weight check at least every 6 weeks to make sure therapy is either helpful or not. Your last weight in our records may not be accurate so we need the baseline to guide recommendations going forward.  6. Set up Dietician visit at [phone].   7. Get labs done either as a scheduled visit or as a walk up lab at [facility] or [facility] if more convenient. I suspect your fatigue is based on your fractured sleep schedule and possibly untreated sleep apnea but we'll want to check labs for issues that affect your energy as well.       We discussed [facility] Bariatric Basics including:  -eating 3 meals daily  -reviewed metabolic needs for weight loss based on Resting Metabolic Rate  -protein goals supportive of healthy weight loss  -avoiding/limiting calorie containing beverages  -We discussed the importance of restorative sleep and stress management in maintaining a healthy weight.  -We discussed the National Weight Control Registry healthy weight maintenance strategies and ways to optimize metabolism.  -We discussed the importance of physical activity including cardiovascular and strength training in maintaining a healthier weight and explored viable options.      Most recent labs:  Lab Results   Component Value Date    HGB 15.4 11/16/2022     Lab Results   Component Value Date    CHOL 201 (H) 06/23/2005     No results found for: HDL  No components found for:  LDLCALC  No results found for: TRIG  No results found for: CHOLHDL  No results found for: ALT, AST, GGT, ALKPHOS, BILITOT  No results found for: HGBA1C  No results found for: B12  No components found for: VITDT1  No results found for: MARIELY  No results found for: PTHI  No results found for: ZN  No results found for: VIB1WB  No results found for: TSH  No results found for: TEST    DIETARY HISTORY  Tracking technique: limited, cutting out sugar has been his main goal  Positive Changes Since Last Visit: off Monster and candy  Struggling With: mindfulness, low energy    Getting Adequate Protein: he thinks so but not tracking yet, needs dietician visit  Sleep schedule: see above.      PHYSICAL ACTIVITY PATTERNS:  Cardiovascular: see above  Strength Training: see above, garage work on cars.    REVIEW OF SYSTEMS  Fatigued..  PHYSICAL EXAM:  Vitals: There were no vitals taken for this visit.  Weight:   Wt Readings from Last 3 Encounters:   08/16/23 (!) 154.2 kg (340 lb)   11/16/22 (!) 214.6 kg (473 lb)   11/09/22 (!) 215.1 kg (474 lb 4.8 oz)         GEN: Pleasant, well groomed, in no acute distress  HEENT:  thick neck. 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  .  NECK: No swelling.  HEART: .  LUNGS: No respiratory difficulty noted. No cough. .  ABDOMEN: .  EXTREMITIES: No tremor. ..  NEURO: Alert and Oriented X3, fluent speech. .  SKIN: No visible rashes.   Fatigud affect, no sleep shiners evident. .    Interim study results: hasn't done labs yet..      30 minutes spent by me on the date of the encounter doing chart review, history and exam, documentation and further activities per the note   Ji Cox MD  [facility]  11:16 AM  9/27/2023      Video-Visit Details    Type of service:  Video Visit    Platform used for Video Visit: Nulogy    Video End Time (time video stopped): 11:50 AM    Originating Location (pt. Location): Home      Distant Location (provider location):   On-site    Distant Location (provider location):  [facility]

## 2023-11-25 ENCOUNTER — HEALTH MAINTENANCE LETTER (OUTPATIENT)
Age: 28
End: 2023-11-25

## 2025-01-04 ENCOUNTER — HEALTH MAINTENANCE LETTER (OUTPATIENT)
Age: 30
End: 2025-01-04